# Patient Record
Sex: FEMALE | Race: WHITE | NOT HISPANIC OR LATINO | Employment: OTHER | URBAN - METROPOLITAN AREA
[De-identification: names, ages, dates, MRNs, and addresses within clinical notes are randomized per-mention and may not be internally consistent; named-entity substitution may affect disease eponyms.]

---

## 2017-03-06 ENCOUNTER — OFFICE VISIT (OUTPATIENT)
Dept: OBSTETRICS AND GYNECOLOGY | Facility: CLINIC | Age: 47
End: 2017-03-06
Payer: COMMERCIAL

## 2017-03-06 VITALS
SYSTOLIC BLOOD PRESSURE: 142 MMHG | WEIGHT: 153.25 LBS | BODY MASS INDEX: 29.92 KG/M2 | DIASTOLIC BLOOD PRESSURE: 90 MMHG

## 2017-03-06 DIAGNOSIS — Z01.419 VISIT FOR GYNECOLOGIC EXAMINATION: Primary | ICD-10-CM

## 2017-03-06 DIAGNOSIS — Z12.39 BREAST CANCER SCREENING: ICD-10-CM

## 2017-03-06 PROCEDURE — 99396 PREV VISIT EST AGE 40-64: CPT | Mod: S$GLB,,, | Performed by: OBSTETRICS & GYNECOLOGY

## 2017-03-06 PROCEDURE — 88142 CYTOPATH C/V THIN LAYER: CPT | Performed by: PATHOLOGY

## 2017-03-06 PROCEDURE — 99999 PR PBB SHADOW E&M-EST. PATIENT-LVL II: CPT | Mod: PBBFAC,,, | Performed by: OBSTETRICS & GYNECOLOGY

## 2017-03-06 PROCEDURE — 88141 CYTOPATH C/V INTERPRET: CPT | Mod: ,,, | Performed by: PATHOLOGY

## 2017-03-06 RX ORDER — FUROSEMIDE 40 MG/1
TABLET ORAL
Refills: 1 | COMMUNITY
Start: 2017-01-20 | End: 2022-12-15

## 2017-03-06 NOTE — PROGRESS NOTES
HISTORY OF PRESENT ILLNESS:    Carmita Cunningham is a 46 y.o. female, , Patient's last menstrual period was 2017 (exact date).,  presents for a routine exam and has no complaints. PAP DUE AND SUBMITTED. MAMMO DUE AND ORDERED.  MENSES NOT HEAVY, AND NO INTERMENSTRUAL, NO C/O    LAST VISIT 2015:  PAP DUE 2016. MENSES PAST SEVERAL MONTHS HAS HAD MORE CLOTS BUT NOT NECESSARILY HEAVIER. MENSES EVERY 6 WEEKS, TYPICAL FOR HER, NO INTERMENSTRUAL, LAST 5 DAYS. VASECTOMY FOR CONTRACEPTION. DISCUSSED OPTIONS FOR CONTROLLING HEAVY MENSES BUT PT PREFERS WATCHFUL WAITING. MAMMO DUE NEXT MONTH, ORDERED. DIFFICULTY WITH WEIGHT LOSS - WILL CHECK LABS AND DISCUSSED DIET  LAST VISIT :  DOING WELL, NEEDS REF FOR MAMMO. NO GYN C/O  PAP SUBMITTED AND DISCUSSED SCREENING RECOMMENDATIONS    History reviewed. No pertinent past medical history.    History reviewed. No pertinent surgical history.    MEDICATIONS AND ALLERGIES:      Current Outpatient Prescriptions:     furosemide (LASIX) 40 MG tablet, TK SS TO ONE T PO QD PRF EDEMA, Disp: , Rfl: 1    Review of patient's allergies indicates:   Allergen Reactions    No known drug allergies        Family History   Problem Relation Age of Onset    Breast cancer Maternal Grandmother 68    Colon cancer Neg Hx     Ovarian cancer Neg Hx     Stroke Neg Hx     Diabetes Neg Hx     Hypertension Neg Hx        Social History     Social History    Marital status:      Spouse name: N/A    Number of children: N/A    Years of education: N/A     Occupational History    Not on file.     Social History Main Topics    Smoking status: Never Smoker    Smokeless tobacco: Never Used    Alcohol use Yes      Comment: occasional    Drug use: No    Sexual activity: Yes     Partners: Male     Other Topics Concern    Not on file     Social History Narrative       COMPREHENSIVE GYN HISTORY:  PAP History: Denies abnormal Paps.  Infection History: Denies STDs. Denies PID.  Benign  History: Denies uterine fibroids. Denies ovarian cysts. Denies endometriosis. Denies other conditions.  Cancer History: Denies cervical cancer. Denies uterine cancer or hyperplasia. Denies ovarian cancer. Denies vulvar cancer or pre-cancer. Denies vaginal cancer or pre-cancer. Denies breast cancer. Denies colon cancer.  Sexual Activity History: Reports currently being sexually active  Menstrual History: Monthly, mild-moderate.  Contraception:vasectomy    ROS:  GENERAL: No weight changes. No swelling. No fatigue. No fever.  CARDIOVASCULAR: No chest pain. No shortness of breath. No leg cramps.   NEUROLOGICAL: No headaches. No vision changes.  BREASTS: No pain. No lumps. No discharge.  ABDOMEN: No pain. No nausea. No vomiting. No diarrhea. No constipation.  REPRODUCTIVE: No abnormal bleeding.   VULVA: No pain. No lesions. No itching.  VAGINA: No relaxation. No itching. No odor. No discharge. No lesions.  URINARY: No incontinence. No nocturia. No frequency. No dysuria.    BP (!) 142/90  Wt 69.5 kg (153 lb 3.5 oz)  LMP 02/08/2017 (Exact Date)  BMI 29.92 kg/m2    PE:  APPEARANCE: Well nourished, well developed, in no acute distress.  AFFECT: WNL, alert and oriented x 3.  SKIN: No acne or hirsutism.  NECK: Neck symmetric, without masses or thyromegaly.  NODES: No inguinal, cervical, axillary or femoral lymph node enlargement.  CHEST: Good respiratory effort.   ABDOMEN: Soft. No tenderness or masses. No hepatosplenomegaly. No hernias.  BREASTS: Symmetrical, no skin changes, visible lesions, palpable masses or nipple discharge bilaterally.  PELVIC: External female genitalia without lesions.  Female hair distribution. Adequate perineal body, Normal urethral meatus. Vagina moist and well rugated without lesions or discharge.  No significant cystocele or rectocele present. Cervix pink without lesions, discharge or tenderness. Uterus is normal size, regular, mobile and nontender. Adnexa without masses or  tenderness.  EXTREMITIES: No edema    PROCEDURES:  Pap    DIAGNOSIS:  1. Visit for gynecologic examination  Liquid-based pap smear, screening   2. Breast cancer screening  Mammo Digital Screening Bilat with Tomosynthesis CAD       LABS AND TESTS ORDERED:    MEDICATIONS PRESCRIBED:    COUNSELING:   The patient was counseled today on ACS PAP guidelines, with recommendations for yearly pelvic exams unless their uterus, cervix, and ovaries were removed for benign reasons; in that case, examinations every 3-5 years are recommended.  The patient was also counseled regarding monthly breast self-examination, routine STD screening for at-risk populations, prophylactic immunizations for transmitted infections such as  HPV, Pertussis, or Influenza as appropriate, and yearly mammograms when indicated by ACS guidelines.  She was advised to see her primary care physician for all other health maintenance.    FOLLOW-UP with me annually.

## 2017-03-13 ENCOUNTER — PATIENT MESSAGE (OUTPATIENT)
Dept: OBSTETRICS AND GYNECOLOGY | Facility: CLINIC | Age: 47
End: 2017-03-13

## 2017-03-13 ENCOUNTER — HOSPITAL ENCOUNTER (OUTPATIENT)
Dept: RADIOLOGY | Facility: HOSPITAL | Age: 47
Discharge: HOME | End: 2017-03-13
Attending: OBSTETRICS & GYNECOLOGY
Payer: COMMERCIAL

## 2017-03-13 DIAGNOSIS — Z12.31 VISIT FOR SCREENING MAMMOGRAM: ICD-10-CM

## 2017-03-13 DIAGNOSIS — Z12.39 BREAST CANCER SCREENING: ICD-10-CM

## 2017-03-13 PROCEDURE — 77063 BREAST TOMOSYNTHESIS BI: CPT | Mod: 26,,, | Performed by: RADIOLOGY

## 2017-03-13 PROCEDURE — 77067 SCR MAMMO BI INCL CAD: CPT | Mod: TC

## 2017-03-13 PROCEDURE — 77067 SCR MAMMO BI INCL CAD: CPT | Mod: 26,,, | Performed by: RADIOLOGY

## 2018-05-14 ENCOUNTER — TELEPHONE (OUTPATIENT)
Dept: OBSTETRICS AND GYNECOLOGY | Facility: CLINIC | Age: 48
End: 2018-05-14

## 2018-05-14 DIAGNOSIS — Z12.31 VISIT FOR SCREENING MAMMOGRAM: Primary | ICD-10-CM

## 2018-05-14 NOTE — TELEPHONE ENCOUNTER
Called patient back, orderws have been entered, and appt scheduled.. Patient is aware of date and time..

## 2018-05-14 NOTE — TELEPHONE ENCOUNTER
----- Message from Sherlyn Deshpande sent at 5/11/2018  2:37 PM CDT -----  Contact: SANIYA ERVIN [[id number]]            Name of Who is Calling: SANIYA ERVIN [[id number]]      What is the request in detail: requesting MMG orders       Can the clinic reply by [facility]:no      [facility]:[phone]

## 2018-05-21 ENCOUNTER — HOSPITAL ENCOUNTER (OUTPATIENT)
Dept: RADIOLOGY | Facility: HOSPITAL | Age: 48
Discharge: HOME | End: 2018-05-21
Attending: OBSTETRICS & GYNECOLOGY
Payer: COMMERCIAL

## 2018-05-21 DIAGNOSIS — Z12.31 VISIT FOR SCREENING MAMMOGRAM: ICD-10-CM

## 2018-05-21 PROCEDURE — 77067 SCR MAMMO BI INCL CAD: CPT | Mod: 26,,, | Performed by: RADIOLOGY

## 2018-05-21 PROCEDURE — 77067 SCR MAMMO BI INCL CAD: CPT | Mod: TC

## 2018-05-21 PROCEDURE — 77063 BREAST TOMOSYNTHESIS BI: CPT | Mod: 26,,, | Performed by: RADIOLOGY

## 2019-05-15 ENCOUNTER — TELEPHONE (OUTPATIENT)
Dept: OBSTETRICS AND GYNECOLOGY | Facility: CLINIC | Age: 49
End: 2019-05-15

## 2019-05-15 DIAGNOSIS — Z12.31 VISIT FOR SCREENING MAMMOGRAM: Primary | ICD-10-CM

## 2019-05-15 NOTE — TELEPHONE ENCOUNTER
Called patient back, no answer left message - mammo orders have been entered, patient can schedule at any  time..

## 2019-05-15 NOTE — TELEPHONE ENCOUNTER
----- Message from Jeremy Bonner sent at 5/15/2019  4:46 PM CDT -----  Contact: Pt  Needs Advice    Reason for call:The Pt received her reminder letter to schedule her Mammo.  Could you send over an order/referral for the Pt and call her after to let her know she can call us back to schedule that appt please?        Communication Preference:[phone]    Additional Information:

## 2019-06-24 ENCOUNTER — HOSPITAL ENCOUNTER (OUTPATIENT)
Dept: RADIOLOGY | Facility: HOSPITAL | Age: 49
Discharge: HOME | End: 2019-06-24
Attending: OBSTETRICS & GYNECOLOGY
Payer: COMMERCIAL

## 2019-06-24 VITALS — BODY MASS INDEX: 30.04 KG/M2 | WEIGHT: 153 LBS | HEIGHT: 60 IN

## 2019-06-24 DIAGNOSIS — Z12.31 VISIT FOR SCREENING MAMMOGRAM: ICD-10-CM

## 2019-06-24 PROCEDURE — 77067 SCR MAMMO BI INCL CAD: CPT | Mod: 26,,, | Performed by: RADIOLOGY

## 2019-06-24 PROCEDURE — 77063 MAMMO DIGITAL SCREENING BILAT WITH TOMOSYNTHESIS_CAD: ICD-10-PCS | Mod: 26,,, | Performed by: RADIOLOGY

## 2019-06-24 PROCEDURE — 77067 MAMMO DIGITAL SCREENING BILAT WITH TOMOSYNTHESIS_CAD: ICD-10-PCS | Mod: 26,,, | Performed by: RADIOLOGY

## 2019-06-24 PROCEDURE — 77063 BREAST TOMOSYNTHESIS BI: CPT | Mod: 26,,, | Performed by: RADIOLOGY

## 2019-06-24 PROCEDURE — 77067 SCR MAMMO BI INCL CAD: CPT | Mod: TC

## 2020-07-22 ENCOUNTER — OFFICE VISIT (OUTPATIENT)
Dept: OBSTETRICS AND GYNECOLOGY | Facility: CLINIC | Age: 50
End: 2020-07-22
Payer: COMMERCIAL

## 2020-07-22 VITALS
SYSTOLIC BLOOD PRESSURE: 140 MMHG | WEIGHT: 167.56 LBS | HEIGHT: 60 IN | BODY MASS INDEX: 32.89 KG/M2 | DIASTOLIC BLOOD PRESSURE: 90 MMHG

## 2020-07-22 DIAGNOSIS — K64.9 HEMORRHOIDS, UNSPECIFIED HEMORRHOID TYPE: ICD-10-CM

## 2020-07-22 DIAGNOSIS — K62.5 RECTAL BLEED: ICD-10-CM

## 2020-07-22 DIAGNOSIS — Z01.419 VISIT FOR GYNECOLOGIC EXAMINATION: Primary | ICD-10-CM

## 2020-07-22 DIAGNOSIS — Z12.39 BREAST CANCER SCREENING: ICD-10-CM

## 2020-07-22 PROCEDURE — 3008F BODY MASS INDEX DOCD: CPT | Mod: CPTII,S$GLB,, | Performed by: OBSTETRICS & GYNECOLOGY

## 2020-07-22 PROCEDURE — 88142 CYTOPATH C/V THIN LAYER: CPT

## 2020-07-22 PROCEDURE — 3008F PR BODY MASS INDEX (BMI) DOCUMENTED: ICD-10-PCS | Mod: CPTII,S$GLB,, | Performed by: OBSTETRICS & GYNECOLOGY

## 2020-07-22 PROCEDURE — 99999 PR PBB SHADOW E&M-EST. PATIENT-LVL III: ICD-10-PCS | Mod: PBBFAC,,, | Performed by: OBSTETRICS & GYNECOLOGY

## 2020-07-22 PROCEDURE — 87624 HPV HI-RISK TYP POOLED RSLT: CPT

## 2020-07-22 PROCEDURE — 99999 PR PBB SHADOW E&M-EST. PATIENT-LVL III: CPT | Mod: PBBFAC,,, | Performed by: OBSTETRICS & GYNECOLOGY

## 2020-07-22 PROCEDURE — 99386 PREV VISIT NEW AGE 40-64: CPT | Mod: S$GLB,,, | Performed by: OBSTETRICS & GYNECOLOGY

## 2020-07-22 PROCEDURE — 99386 PR PREVENTIVE VISIT,NEW,40-64: ICD-10-PCS | Mod: S$GLB,,, | Performed by: OBSTETRICS & GYNECOLOGY

## 2020-07-22 NOTE — PROGRESS NOTES
HISTORY OF PRESENT ILLNESS:    Carmita Cunningham is a 49 y.o. female, , Patient's last menstrual period was 2020.,  presents for a routine exam and has no complaints. COTESTING,REF MAMMO.  HAS SOME BLEEDING HEMORRHOIDS - COMFORT MEASURES, HAS RESOLVED WITH BETTER DIET/ MORE VEGGIES AND NO PAIN.  FIT KIT AND IF ABNL REF AND COLORECTAL  ON BOAT AND AT [facility] THRU MUCH OF COVID ..      LAST 2017:   PAP DUE AND SUBMITTED. MAMMO DUE AND ORDERED.  MENSES NOT HEAVY, AND NO INTERMENSTRUAL, NO C/O  LAST VISIT 2015:  PAP DUE 2016. MENSES PAST SEVERAL MONTHS HAS HAD MORE CLOTS BUT NOT NECESSARILY HEAVIER. MENSES EVERY 6 WEEKS, TYPICAL FOR HER, NO INTERMENSTRUAL, LAST 5 DAYS. VASECTOMY FOR CONTRACEPTION. DISCUSSED OPTIONS FOR CONTROLLING HEAVY MENSES BUT PT PREFERS WATCHFUL WAITING. MAMMO DUE NEXT MONTH, ORDERED. DIFFICULTY WITH WEIGHT LOSS - WILL CHECK LABS AND DISCUSSED DIET  LAST VISIT :  DOING WELL, NEEDS REF FOR MAMMO. NO GYN C/O  PAP SUBMITTED AND DISCUSSED SCREENING RECOMMENDATIONS    History reviewed. No pertinent past medical history.    History reviewed. No pertinent surgical history.    MEDICATIONS AND ALLERGIES:      Current Outpatient Medications:     furosemide (LASIX) 40 MG tablet, TK SS TO ONE T PO QD PRF EDEMA, Disp: , Rfl: 1    Review of patient's allergies indicates:   Allergen Reactions    No known drug allergies        Family History   Problem Relation Age of Onset    Breast cancer Maternal Grandmother 68    Colon cancer Neg Hx     Ovarian cancer Neg Hx     Stroke Neg Hx     Diabetes Neg Hx     Hypertension Neg Hx        Social History     Socioeconomic History    Marital status:      Spouse name: Not on file    Number of children: Not on file    Years of education: Not on file    Highest education level: Not on file   Occupational History    Not on file   Social Needs    Financial resource strain: Not on file    Food insecurity     Worry: Not on file      Inability: Not on file    Transportation needs     Medical: Not on file     Non-medical: Not on file   Tobacco Use    Smoking status: Never Smoker    Smokeless tobacco: Never Used   Substance and Sexual Activity    Alcohol use: Yes     Comment: occasional    Drug use: No    Sexual activity: Yes     Partners: Male   Lifestyle    Physical activity     Days per week: Not on file     Minutes per session: Not on file    Stress: Not on file   Relationships    Social connections     Talks on phone: Not on file     Gets together: Not on file     Attends [employer] service: Not on file     Active member of club or organization: Not on file     Attends meetings of clubs or organizations: Not on file     Relationship status: Not on file   Other Topics Concern    Not on file   Social History Narrative    Not on file       COMPREHENSIVE GYN HISTORY:  PAP History: Denies abnormal Paps.  Infection History: Denies STDs. Denies PID.  Benign History: Denies uterine fibroids. Denies ovarian cysts. Denies endometriosis. Denies other conditions.  Cancer History: Denies cervical cancer. Denies uterine cancer or hyperplasia. Denies ovarian cancer. Denies vulvar cancer or pre-cancer. Denies vaginal cancer or pre-cancer. Denies breast cancer. Denies colon cancer.  Sexual Activity History: Reports currently being sexually active  Menstrual History: Monthly, mild-moderate.  Contraception:vasectomy    ROS:  GENERAL: No weight changes. No swelling. No fatigue. No fever.  CARDIOVASCULAR: No chest pain. No shortness of breath. No leg cramps.   NEUROLOGICAL: No headaches. No vision changes.  BREASTS: No pain. No lumps. No discharge.  ABDOMEN: No pain. No nausea. No vomiting. No diarrhea. No constipation.  REPRODUCTIVE: No abnormal bleeding.   VULVA: No pain. No lesions. No itching.  VAGINA: No relaxation. No itching. No odor. No discharge. No lesions.  URINARY: No incontinence. No nocturia. No frequency. No dysuria.    BP (!) 140/90    Ht 5' (1.524 m)   Wt 76 kg (167 lb 8.8 oz)   LMP 07/13/2020   BMI 32.72 kg/m²     PE:  APPEARANCE: Well nourished, well developed, in no acute distress.  AFFECT: WNL, alert and oriented x 3.  SKIN: No acne or hirsutism.  NECK: Neck symmetric, without masses or thyromegaly.  NODES: No inguinal, cervical, axillary or femoral lymph node enlargement.  CHEST: Good respiratory effort.   ABDOMEN: Soft. No tenderness or masses. No hepatosplenomegaly. No hernias.  BREASTS: Symmetrical, no skin changes, visible lesions, palpable masses or nipple discharge bilaterally.  PELVIC: External female genitalia without lesions.  Female hair distribution. Adequate perineal body, Normal urethral meatus. Vagina moist and well rugated without lesions or discharge.  No significant cystocele or rectocele present. Cervix pink without lesions, discharge or tenderness. Uterus is normal size, regular, mobile and nontender. Adnexa without masses or tenderness.  EXTREMITIES: No edema    PROCEDURES:  Pap    DIAGNOSIS:  1. Visit for gynecologic examination  Liquid-Based Pap Smear, Screening    HPV High Risk Genotypes, PCR   2. Breast cancer screening  Mammo Digital Screening Bilat w/ Alexander   3. Rectal bleed  Occult Blood Stool, CA Screen   4. Hemorrhoids, unspecified hemorrhoid type  Occult Blood Stool, CA Screen       LABS AND TESTS ORDERED:    MEDICATIONS PRESCRIBED:    COUNSELING:   The patient was counseled today on ACS PAP guidelines, with recommendations for yearly pelvic exams unless their uterus, cervix, and ovaries were removed for benign reasons; in that case, examinations every 3-5 years are recommended.  The patient was also counseled regarding monthly breast self-examination, routine STD screening for at-risk populations, prophylactic immunizations for transmitted infections such as  HPV, Pertussis, or Influenza as appropriate, and yearly mammograms when indicated by ACS guidelines.  She was advised to see her primary care  physician for all other health maintenance.    FOLLOW-UP with me annually.

## 2020-07-27 ENCOUNTER — LAB VISIT (OUTPATIENT)
Dept: LAB | Facility: HOSPITAL | Age: 50
End: 2020-07-27
Attending: OBSTETRICS & GYNECOLOGY
Payer: COMMERCIAL

## 2020-07-27 DIAGNOSIS — R19.5 OCCULT BLOOD IN STOOLS: ICD-10-CM

## 2020-07-27 DIAGNOSIS — Z12.11 ENCOUNTER FOR SCREENING FECAL OCCULT BLOOD TESTING: ICD-10-CM

## 2020-07-27 PROCEDURE — 82274 ASSAY TEST FOR BLOOD FECAL: CPT

## 2020-07-30 ENCOUNTER — PATIENT OUTREACH (OUTPATIENT)
Dept: ADMINISTRATIVE | Facility: HOSPITAL | Age: 50
End: 2020-07-30

## 2020-07-30 ENCOUNTER — TELEPHONE (OUTPATIENT)
Dept: OBSTETRICS AND GYNECOLOGY | Facility: CLINIC | Age: 50
End: 2020-07-30

## 2020-07-30 DIAGNOSIS — Z12.11 ENCOUNTER FOR SCREENING FECAL OCCULT BLOOD TESTING: ICD-10-CM

## 2020-07-30 DIAGNOSIS — R19.5 OCCULT BLOOD IN STOOLS: Primary | ICD-10-CM

## 2020-07-30 LAB
HPV HR 12 DNA SPEC QL NAA+PROBE: NEGATIVE
HPV16 AG SPEC QL: NEGATIVE
HPV18 DNA SPEC QL NAA+PROBE: NEGATIVE

## 2020-07-30 NOTE — PROGRESS NOTES
Patient came up on the FOBT workbook needing orders: Occult Blood Stool, CA screen enter on 07/22/2020 by Sandy Martínez MD. Diagnoses Hemorrhoids Patient is 49 years old. Message sent to staff to enter fit kit order

## 2020-08-03 ENCOUNTER — HOSPITAL ENCOUNTER (OUTPATIENT)
Dept: RADIOLOGY | Facility: HOSPITAL | Age: 50
Discharge: HOME | End: 2020-08-03
Attending: OBSTETRICS & GYNECOLOGY
Payer: COMMERCIAL

## 2020-08-03 DIAGNOSIS — Z12.39 BREAST CANCER SCREENING: ICD-10-CM

## 2020-08-03 LAB — HEMOCCULT STL QL IA: NEGATIVE

## 2020-08-03 PROCEDURE — 77063 BREAST TOMOSYNTHESIS BI: CPT | Mod: 26,,, | Performed by: RADIOLOGY

## 2020-08-03 PROCEDURE — 77063 MAMMO DIGITAL SCREENING BILAT WITH TOMOSYNTHESIS_CAD: ICD-10-PCS | Mod: 26,,, | Performed by: RADIOLOGY

## 2020-08-03 PROCEDURE — 77067 SCR MAMMO BI INCL CAD: CPT | Mod: TC

## 2020-08-03 PROCEDURE — 77067 MAMMO DIGITAL SCREENING BILAT WITH TOMOSYNTHESIS_CAD: ICD-10-PCS | Mod: 26,,, | Performed by: RADIOLOGY

## 2020-08-03 PROCEDURE — 77067 SCR MAMMO BI INCL CAD: CPT | Mod: 26,,, | Performed by: RADIOLOGY

## 2020-08-04 LAB
FINAL PATHOLOGIC DIAGNOSIS: NORMAL
Lab: NORMAL

## 2021-04-16 ENCOUNTER — PATIENT MESSAGE (OUTPATIENT)
Dept: RESEARCH | Facility: HOSPITAL | Age: 51
End: 2021-04-16

## 2021-07-26 ENCOUNTER — OFFICE VISIT (OUTPATIENT)
Dept: OBSTETRICS AND GYNECOLOGY | Facility: CLINIC | Age: 51
End: 2021-07-26
Payer: COMMERCIAL

## 2021-07-26 VITALS
BODY MASS INDEX: 32.46 KG/M2 | WEIGHT: 166.25 LBS | SYSTOLIC BLOOD PRESSURE: 122 MMHG | DIASTOLIC BLOOD PRESSURE: 74 MMHG

## 2021-07-26 DIAGNOSIS — Z01.419 VISIT FOR GYNECOLOGIC EXAMINATION: Primary | ICD-10-CM

## 2021-07-26 DIAGNOSIS — Z12.31 ENCOUNTER FOR SCREENING MAMMOGRAM FOR MALIGNANT NEOPLASM OF BREAST: ICD-10-CM

## 2021-07-26 PROCEDURE — 3008F BODY MASS INDEX DOCD: CPT | Mod: CPTII,S$GLB,, | Performed by: OBSTETRICS & GYNECOLOGY

## 2021-07-26 PROCEDURE — 1160F RVW MEDS BY RX/DR IN RCRD: CPT | Mod: CPTII,S$GLB,, | Performed by: OBSTETRICS & GYNECOLOGY

## 2021-07-26 PROCEDURE — 99396 PREV VISIT EST AGE 40-64: CPT | Mod: S$GLB,,, | Performed by: OBSTETRICS & GYNECOLOGY

## 2021-07-26 PROCEDURE — 99999 PR PBB SHADOW E&M-EST. PATIENT-LVL II: ICD-10-PCS | Mod: PBBFAC,,, | Performed by: OBSTETRICS & GYNECOLOGY

## 2021-07-26 PROCEDURE — 1160F PR REVIEW ALL MEDS BY PRESCRIBER/CLIN PHARMACIST DOCUMENTED: ICD-10-PCS | Mod: CPTII,S$GLB,, | Performed by: OBSTETRICS & GYNECOLOGY

## 2021-07-26 PROCEDURE — 1159F MED LIST DOCD IN RCRD: CPT | Mod: CPTII,S$GLB,, | Performed by: OBSTETRICS & GYNECOLOGY

## 2021-07-26 PROCEDURE — 99396 PR PREVENTIVE VISIT,EST,40-64: ICD-10-PCS | Mod: S$GLB,,, | Performed by: OBSTETRICS & GYNECOLOGY

## 2021-07-26 PROCEDURE — 1159F PR MEDICATION LIST DOCUMENTED IN MEDICAL RECORD: ICD-10-PCS | Mod: CPTII,S$GLB,, | Performed by: OBSTETRICS & GYNECOLOGY

## 2021-07-26 PROCEDURE — 3008F PR BODY MASS INDEX (BMI) DOCUMENTED: ICD-10-PCS | Mod: CPTII,S$GLB,, | Performed by: OBSTETRICS & GYNECOLOGY

## 2021-07-26 PROCEDURE — 99999 PR PBB SHADOW E&M-EST. PATIENT-LVL II: CPT | Mod: PBBFAC,,, | Performed by: OBSTETRICS & GYNECOLOGY

## 2021-08-09 ENCOUNTER — HOSPITAL ENCOUNTER (OUTPATIENT)
Dept: RADIOLOGY | Facility: HOSPITAL | Age: 51
Discharge: HOME | End: 2021-08-09
Attending: OBSTETRICS & GYNECOLOGY
Payer: COMMERCIAL

## 2021-08-09 VITALS — WEIGHT: 166 LBS | BODY MASS INDEX: 32.59 KG/M2 | HEIGHT: 60 IN

## 2021-08-09 DIAGNOSIS — Z12.31 ENCOUNTER FOR SCREENING MAMMOGRAM FOR MALIGNANT NEOPLASM OF BREAST: ICD-10-CM

## 2021-08-09 PROCEDURE — 77063 BREAST TOMOSYNTHESIS BI: CPT | Mod: 26,,, | Performed by: RADIOLOGY

## 2021-08-09 PROCEDURE — 77067 MAMMO DIGITAL SCREENING BILAT WITH TOMO: ICD-10-PCS | Mod: 26,,, | Performed by: RADIOLOGY

## 2021-08-09 PROCEDURE — 77067 SCR MAMMO BI INCL CAD: CPT | Mod: 26,,, | Performed by: RADIOLOGY

## 2021-08-09 PROCEDURE — 77063 MAMMO DIGITAL SCREENING BILAT WITH TOMO: ICD-10-PCS | Mod: 26,,, | Performed by: RADIOLOGY

## 2021-08-09 PROCEDURE — 77067 SCR MAMMO BI INCL CAD: CPT | Mod: TC

## 2021-08-18 ENCOUNTER — HOSPITAL ENCOUNTER (OUTPATIENT)
Dept: RADIOLOGY | Facility: HOSPITAL | Age: 51
Discharge: HOME | End: 2021-08-18
Attending: OBSTETRICS & GYNECOLOGY
Payer: COMMERCIAL

## 2021-08-18 DIAGNOSIS — R92.8 ABNORMAL MAMMOGRAM OF RIGHT BREAST: ICD-10-CM

## 2021-08-18 PROCEDURE — 77065 MAMMO DIGITAL DIAGNOSTIC RIGHT WITH TOMO: ICD-10-PCS | Mod: 26,RT,, | Performed by: RADIOLOGY

## 2021-08-18 PROCEDURE — 77061 BREAST TOMOSYNTHESIS UNI: CPT | Mod: TC,RT

## 2021-08-18 PROCEDURE — 77061 BREAST TOMOSYNTHESIS UNI: CPT | Mod: 26,RT,, | Performed by: RADIOLOGY

## 2021-08-18 PROCEDURE — 77065 DX MAMMO INCL CAD UNI: CPT | Mod: 26,RT,, | Performed by: RADIOLOGY

## 2021-08-18 PROCEDURE — 77061 MAMMO DIGITAL DIAGNOSTIC RIGHT WITH TOMO: ICD-10-PCS | Mod: 26,RT,, | Performed by: RADIOLOGY

## 2021-11-09 ENCOUNTER — TELEPHONE (OUTPATIENT)
Dept: BARIATRICS | Facility: CLINIC | Age: 51
End: 2021-11-09
Payer: COMMERCIAL

## 2022-08-15 ENCOUNTER — TELEPHONE (OUTPATIENT)
Dept: OBSTETRICS AND GYNECOLOGY | Facility: CLINIC | Age: 52
End: 2022-08-15
Payer: COMMERCIAL

## 2022-08-15 DIAGNOSIS — Z12.31 ENCOUNTER FOR SCREENING MAMMOGRAM FOR BREAST CANCER: Primary | ICD-10-CM

## 2022-08-15 NOTE — TELEPHONE ENCOUNTER
----- Message from Landy Rice LPN sent at 8/12/2022  3:31 PM CDT -----  Regarding: FW: Mammo orders    ----- Message -----  From: Nguyen Garcia  Sent: 8/12/2022   9:20 AM CDT  To: Tanya ALBARRAN Staff  Subject: Mammo orders                                      Patient call to see about getting her mammo orders , if Dr Martínez could please put them in Epic    Thank you

## 2022-08-18 ENCOUNTER — TELEPHONE (OUTPATIENT)
Dept: OBSTETRICS AND GYNECOLOGY | Facility: CLINIC | Age: 52
End: 2022-08-18
Payer: COMMERCIAL

## 2022-08-18 NOTE — TELEPHONE ENCOUNTER
----- Message from Nguyen Garcia sent at 8/12/2022  9:18 AM CDT -----  Regarding: Mammo orders   Patient call to see about getting her mammo orders , if Dr Martínez could please put them in Epic    Thank you

## 2022-08-18 NOTE — TELEPHONE ENCOUNTER
Pt call was returned. Pt stated that her mammo orders were placed. Also advised her that her upcoming appointment would have to be r/s. Pt stated that she will r/s on her own.

## 2022-08-22 ENCOUNTER — HOSPITAL ENCOUNTER (OUTPATIENT)
Dept: RADIOLOGY | Facility: HOSPITAL | Age: 52
Discharge: HOME | End: 2022-08-22
Attending: PHYSICIAN ASSISTANT
Payer: COMMERCIAL

## 2022-08-22 DIAGNOSIS — Z12.31 ENCOUNTER FOR SCREENING MAMMOGRAM FOR BREAST CANCER: ICD-10-CM

## 2022-08-22 PROCEDURE — 77063 BREAST TOMOSYNTHESIS BI: CPT | Mod: 26,,, | Performed by: RADIOLOGY

## 2022-08-22 PROCEDURE — 77067 SCR MAMMO BI INCL CAD: CPT | Mod: 26,,, | Performed by: RADIOLOGY

## 2022-08-22 PROCEDURE — 77067 MAMMO DIGITAL SCREENING BILAT WITH TOMO: ICD-10-PCS | Mod: 26,,, | Performed by: RADIOLOGY

## 2022-08-22 PROCEDURE — 77063 BREAST TOMOSYNTHESIS BI: CPT | Mod: TC

## 2022-08-22 PROCEDURE — 77063 MAMMO DIGITAL SCREENING BILAT WITH TOMO: ICD-10-PCS | Mod: 26,,, | Performed by: RADIOLOGY

## 2022-12-02 ENCOUNTER — TELEPHONE (OUTPATIENT)
Dept: OPHTHALMOLOGY | Facility: CLINIC | Age: 52
End: 2022-12-02
Payer: COMMERCIAL

## 2022-12-02 DIAGNOSIS — A15.9 TUBERCULOSIS: ICD-10-CM

## 2022-12-02 DIAGNOSIS — D86.9 SARCOIDOSIS: ICD-10-CM

## 2022-12-02 DIAGNOSIS — H46.9 OPTIC NEURITIS: Primary | ICD-10-CM

## 2022-12-02 NOTE — TELEPHONE ENCOUNTER
----- Message from Ary Parekh sent at 12/2/2022 11:46 AM CST -----  Patient is calling again stating that orders were supposed to be sent over. She is getting doctor's office to resend.    Please contact [phone]

## 2022-12-05 ENCOUNTER — HOSPITAL ENCOUNTER (OUTPATIENT)
Dept: RADIOLOGY | Facility: HOSPITAL | Age: 52
Discharge: HOME | End: 2022-12-05
Payer: COMMERCIAL

## 2022-12-05 DIAGNOSIS — H53.139: Primary | ICD-10-CM

## 2022-12-05 DIAGNOSIS — H53.412 CENTRAL SCOTOMA, LEFT EYE: Primary | ICD-10-CM

## 2022-12-05 DIAGNOSIS — H46.9 OPTIC NEURITIS: ICD-10-CM

## 2022-12-05 DIAGNOSIS — D86.9 SARCOIDOSIS: ICD-10-CM

## 2022-12-05 DIAGNOSIS — A15.9 TUBERCULOSIS: ICD-10-CM

## 2022-12-05 PROCEDURE — 71046 XR CHEST PA AND LATERAL: ICD-10-PCS | Mod: 26,,, | Performed by: RADIOLOGY

## 2022-12-05 PROCEDURE — 71046 X-RAY EXAM CHEST 2 VIEWS: CPT | Mod: 26,,, | Performed by: RADIOLOGY

## 2022-12-05 PROCEDURE — 71046 X-RAY EXAM CHEST 2 VIEWS: CPT | Mod: TC,FY

## 2022-12-12 ENCOUNTER — HOSPITAL ENCOUNTER (OUTPATIENT)
Dept: RADIOLOGY | Facility: HOSPITAL | Age: 52
Discharge: HOME | End: 2022-12-12
Payer: COMMERCIAL

## 2022-12-12 DIAGNOSIS — H53.139: ICD-10-CM

## 2022-12-12 PROCEDURE — 70480 CT ORBIT/EAR/FOSSA W/O DYE: CPT | Mod: TC

## 2022-12-12 PROCEDURE — 70480 CT ORBIT/EAR/FOSSA W/O DYE: CPT | Mod: 26,,, | Performed by: RADIOLOGY

## 2022-12-12 PROCEDURE — 70480 CT ORBITS WITHOUT CONTRAST: ICD-10-PCS | Mod: 26,,, | Performed by: RADIOLOGY

## 2022-12-15 ENCOUNTER — OFFICE VISIT (OUTPATIENT)
Dept: OPHTHALMOLOGY | Facility: CLINIC | Age: 52
End: 2022-12-15
Payer: COMMERCIAL

## 2022-12-15 ENCOUNTER — CLINICAL SUPPORT (OUTPATIENT)
Dept: OPHTHALMOLOGY | Facility: CLINIC | Age: 52
End: 2022-12-15
Payer: COMMERCIAL

## 2022-12-15 DIAGNOSIS — H53.412 CENTRAL SCOTOMA, LEFT EYE: ICD-10-CM

## 2022-12-15 DIAGNOSIS — H47.012 NAION (NON-ARTERITIC ANTERIOR ISCHEMIC OPTIC NEUROPATHY), LEFT EYE: Primary | ICD-10-CM

## 2022-12-15 PROCEDURE — 99203 OFFICE O/P NEW LOW 30 MIN: CPT | Mod: S$GLB,,, | Performed by: OPHTHALMOLOGY

## 2022-12-15 PROCEDURE — 99203 PR OFFICE/OUTPT VISIT, NEW, LEVL III, 30-44 MIN: ICD-10-PCS | Mod: S$GLB,,, | Performed by: OPHTHALMOLOGY

## 2022-12-15 PROCEDURE — 4010F PR ACE/ARB THEARPY RXD/TAKEN: ICD-10-PCS | Mod: CPTII,S$GLB,, | Performed by: OPHTHALMOLOGY

## 2022-12-15 PROCEDURE — 99999 PR PBB SHADOW E&M-EST. PATIENT-LVL II: CPT | Mod: PBBFAC,,, | Performed by: OPHTHALMOLOGY

## 2022-12-15 PROCEDURE — 4010F ACE/ARB THERAPY RXD/TAKEN: CPT | Mod: CPTII,S$GLB,, | Performed by: OPHTHALMOLOGY

## 2022-12-15 PROCEDURE — 99999 PR PBB SHADOW E&M-EST. PATIENT-LVL II: ICD-10-PCS | Mod: PBBFAC,,, | Performed by: OPHTHALMOLOGY

## 2022-12-15 PROCEDURE — 1160F RVW MEDS BY RX/DR IN RCRD: CPT | Mod: CPTII,S$GLB,, | Performed by: OPHTHALMOLOGY

## 2022-12-15 PROCEDURE — 1160F PR REVIEW ALL MEDS BY PRESCRIBER/CLIN PHARMACIST DOCUMENTED: ICD-10-PCS | Mod: CPTII,S$GLB,, | Performed by: OPHTHALMOLOGY

## 2022-12-15 PROCEDURE — 1159F PR MEDICATION LIST DOCUMENTED IN MEDICAL RECORD: ICD-10-PCS | Mod: CPTII,S$GLB,, | Performed by: OPHTHALMOLOGY

## 2022-12-15 PROCEDURE — 1159F MED LIST DOCD IN RCRD: CPT | Mod: CPTII,S$GLB,, | Performed by: OPHTHALMOLOGY

## 2022-12-15 RX ORDER — TIRZEPATIDE 5 MG/.5ML
5 INJECTION, SOLUTION SUBCUTANEOUS WEEKLY
COMMUNITY
Start: 2022-11-14

## 2022-12-15 NOTE — PROGRESS NOTES
Visual field test done.  Patient stated no latex allergies used coverlet       Mrx  2.75+ 0.75 x 92  2.75+ 0.75 x 78

## 2022-12-15 NOTE — PROGRESS NOTES
HPI    Referred by Dr.Joseph Deniz MITCHELL  Patient here for evaluation of Optic Neuritis OS.  Patient states OS cloudy since 11/28/2022. Pt states feels as if she   seeing light more.    I have personally interviewed the patient, reviewed the history and   examined the patient and agree with the technician's exam.    Noted upon awakening in the morning.  CT scan reported as normal. No   evidence of GCA.        Last edited by Iam Barnhart MD on 12/15/2022  3:53 PM.            Assessment /Plan     For exam results, see Encounter Report.    NAION (non-arteritic anterior ischemic optic neuropathy), left eye      Ms. 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 Sje kasandra no evidence to suggest GCA and a CT showed no mass lesion. She has a good chance of regaining some vision in her  left eye. I will repeat her exam and visual field testing in 3 months. IONDT discussed in detail.

## 2022-12-15 NOTE — LETTER
[facility] - [address] [place] [address]  [address]  [facility] [address]  Phone: [phone]  Fax: [phone]   December 15, 2022    Aleks Guaman MD  [address]  [place] [address]    Patient: Carmita Cunningham   MR Number: [id number]   YOB: 1970   Date of Visit: 12/15/2022       Dear Dr. Guaman:    Thank you for referring Carmita Cunningham to me for evaluation. Here is my assessment and plan of care:    Assessment/Plan    For exam results, see Encounter Report.    NAION (non-arteritic anterior ischemic optic neuropathy), left eye      Ms. 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 Sje kasandra no evidence to suggest GCA and a CT showed no mass lesion. She has a good chance of regaining some vision in her  left eye. I will repeat her exam and visual field testing in 3 months. IONDT discussed in detail.          Below you will find my full exam findings. If you have questions, please do not hesitate to call me. I look forward to following Ms. Carmita Cunningham along with you.    Sincerely,          Iam Barnhart MD       CC  No Recipients             Base Eye Exam       Visual Acuity (Snellen - Linear)         Right Left    Dist cc 20/20 CF at face      Correction: Glasses              Tonometry (Applanation, 3:58 PM)         Right Left    Pressure 17 18              Pupils         Dark Light Shape React APD    Right 5 3 Round Brisk None    Left 5 3 Round Brisk +3              Visual Fields    See HVF report             Extraocular Movement         Right Left     Full, Ortho Full, Ortho              Neuro/Psych       Oriented x3: Yes    Mood/Affect: Normal              Dilation       Both eyes: 1% Mydriacyl, 2.5% Phenylephrine @ 4:00 PM                  Slit Lamp and Fundus Exam       External Exam         Right Left    External Normal Normal              Slit Lamp Exam         Right Left     Lids/Lashes Normal Normal    Conjunctiva/Sclera White and quiet White and quiet    Cornea Clear Clear    Anterior Chamber Deep and quiet Deep and quiet    Iris Round and reactive Round and reactive    Lens Clear Clear    Vitreous Normal Normal              Fundus Exam         Right Left    Disc Crowded, no cup 3+ Optic disc edema, 2+ Pallor    C/D Ratio 0.0 0.0    Macula Normal Normal    Vessels Normal Normal    Periphery Normal Normal

## 2023-03-13 ENCOUNTER — OFFICE VISIT (OUTPATIENT)
Dept: OPHTHALMOLOGY | Facility: CLINIC | Age: 53
End: 2023-03-13
Payer: COMMERCIAL

## 2023-03-13 ENCOUNTER — CLINICAL SUPPORT (OUTPATIENT)
Dept: OPHTHALMOLOGY | Facility: CLINIC | Age: 53
End: 2023-03-13
Payer: COMMERCIAL

## 2023-03-13 DIAGNOSIS — H47.012 NAION (NON-ARTERITIC ANTERIOR ISCHEMIC OPTIC NEUROPATHY), LEFT EYE: Primary | ICD-10-CM

## 2023-03-13 DIAGNOSIS — H47.012 NAION (NON-ARTERITIC ANTERIOR ISCHEMIC OPTIC NEUROPATHY), LEFT EYE: ICD-10-CM

## 2023-03-13 PROCEDURE — 92133 POSTERIOR SEGMENT OCT OPTIC NERVE(OCULAR COHERENCE TOMOGRAPHY) - OU - BOTH EYES: ICD-10-PCS | Mod: S$GLB,,, | Performed by: OPHTHALMOLOGY

## 2023-03-13 PROCEDURE — 92083 EXTENDED VISUAL FIELD XM: CPT | Mod: S$GLB,,, | Performed by: OPHTHALMOLOGY

## 2023-03-13 PROCEDURE — 99214 OFFICE O/P EST MOD 30 MIN: CPT | Mod: S$GLB,,, | Performed by: OPHTHALMOLOGY

## 2023-03-13 PROCEDURE — 1160F RVW MEDS BY RX/DR IN RCRD: CPT | Mod: CPTII,S$GLB,, | Performed by: OPHTHALMOLOGY

## 2023-03-13 PROCEDURE — 99999 PR PBB SHADOW E&M-EST. PATIENT-LVL II: ICD-10-PCS | Mod: PBBFAC,,, | Performed by: OPHTHALMOLOGY

## 2023-03-13 PROCEDURE — 92083 HUMPHREY VISUAL FIELD - OU - BOTH EYES: ICD-10-PCS | Mod: S$GLB,,, | Performed by: OPHTHALMOLOGY

## 2023-03-13 PROCEDURE — 99999 PR PBB SHADOW E&M-EST. PATIENT-LVL II: CPT | Mod: PBBFAC,,, | Performed by: OPHTHALMOLOGY

## 2023-03-13 PROCEDURE — 92133 CPTRZD OPH DX IMG PST SGM ON: CPT | Mod: S$GLB,,, | Performed by: OPHTHALMOLOGY

## 2023-03-13 PROCEDURE — 1159F MED LIST DOCD IN RCRD: CPT | Mod: CPTII,S$GLB,, | Performed by: OPHTHALMOLOGY

## 2023-03-13 PROCEDURE — 99214 PR OFFICE/OUTPT VISIT, EST, LEVL IV, 30-39 MIN: ICD-10-PCS | Mod: S$GLB,,, | Performed by: OPHTHALMOLOGY

## 2023-03-13 PROCEDURE — 1160F PR REVIEW ALL MEDS BY PRESCRIBER/CLIN PHARMACIST DOCUMENTED: ICD-10-PCS | Mod: CPTII,S$GLB,, | Performed by: OPHTHALMOLOGY

## 2023-03-13 PROCEDURE — 1159F PR MEDICATION LIST DOCUMENTED IN MEDICAL RECORD: ICD-10-PCS | Mod: CPTII,S$GLB,, | Performed by: OPHTHALMOLOGY

## 2023-03-13 RX ORDER — SODIUM PICOSULFATE, MAGNESIUM OXIDE, AND ANHYDROUS CITRIC ACID 10; 3.5; 12 MG/160ML; G/160ML; G/160ML
LIQUID ORAL
COMMUNITY
Start: 2023-01-10

## 2023-03-13 NOTE — PROGRESS NOTES
HPI    DLS:12/15/2022 [place]    Patient here for 3 month follow up NAION and Review HVF.  Pt states OU vision seem to be improving. Wearing contacts today.  No eye pain.    I have personally interviewed the patient, reviewed the history and   examined the patient and agree with the technician's exam.   Last edited by Ima Barnhart MD on 3/13/2023  9:52 AM.        ROS    Positive for: Psychiatric  Last edited by Iam Barnhart MD on 3/13/2023 10:15 AM.        Assessment /Plan     For exam results, see Encounter Report.    NAION (non-arteritic anterior ischemic optic neuropathy), left eye  -     Bauer Visual Field - OU - Extended - Both Eyes  -     Posterior Segment OCT Optic Nerve- Both eyes      Ms. Cunningham's left optic nerve anatomy and function are consistent with the diagnosis of NAION. No further diagnostic testing is indicated. She will return to Dr. Guaman for continued care and to me as requested

## 2023-08-10 ENCOUNTER — TELEPHONE (OUTPATIENT)
Dept: OBSTETRICS AND GYNECOLOGY | Facility: CLINIC | Age: 53
End: 2023-08-10
Payer: COMMERCIAL

## 2023-08-10 NOTE — TELEPHONE ENCOUNTER
----- Message from Payal Grewal sent at 8/8/2023 10:19 AM CDT -----  Type:  Mammogram    Caller is requesting to schedule their annual mammogram appointment.  Order is not listed in EPIC.  Please enter order and contact patient to schedule.  Name of Caller: pt     Where would they like the mammogram performed? [facility] [place]    Would the patient rather a call back or a response via [facility]? call    Best Call Back Number: [phone] (home)       Additional Information:

## 2023-08-10 NOTE — TELEPHONE ENCOUNTER
Pt call was returned. Pt was notified that orders would not be able to be placed due to her not being seen in office in 2 years. Pt verbalized understanding.

## 2023-10-02 ENCOUNTER — OFFICE VISIT (OUTPATIENT)
Dept: OBSTETRICS AND GYNECOLOGY | Facility: CLINIC | Age: 53
End: 2023-10-02
Payer: COMMERCIAL

## 2023-10-02 VITALS
WEIGHT: 161.38 LBS | BODY MASS INDEX: 31.52 KG/M2 | DIASTOLIC BLOOD PRESSURE: 82 MMHG | SYSTOLIC BLOOD PRESSURE: 124 MMHG

## 2023-10-02 DIAGNOSIS — Z01.419 ENCOUNTER FOR GYNECOLOGICAL EXAMINATION WITHOUT ABNORMAL FINDING: Primary | ICD-10-CM

## 2023-10-02 DIAGNOSIS — Z12.31 BREAST CANCER SCREENING BY MAMMOGRAM: ICD-10-CM

## 2023-10-02 PROCEDURE — 1159F MED LIST DOCD IN RCRD: CPT | Mod: CPTII,S$GLB,, | Performed by: OBSTETRICS & GYNECOLOGY

## 2023-10-02 PROCEDURE — 3074F SYST BP LT 130 MM HG: CPT | Mod: CPTII,S$GLB,, | Performed by: OBSTETRICS & GYNECOLOGY

## 2023-10-02 PROCEDURE — 99999 PR PBB SHADOW E&M-EST. PATIENT-LVL III: ICD-10-PCS | Mod: PBBFAC,,, | Performed by: OBSTETRICS & GYNECOLOGY

## 2023-10-02 PROCEDURE — 99999 PR PBB SHADOW E&M-EST. PATIENT-LVL III: CPT | Mod: PBBFAC,,, | Performed by: OBSTETRICS & GYNECOLOGY

## 2023-10-02 PROCEDURE — 3079F PR MOST RECENT DIASTOLIC BLOOD PRESSURE 80-89 MM HG: ICD-10-PCS | Mod: CPTII,S$GLB,, | Performed by: OBSTETRICS & GYNECOLOGY

## 2023-10-02 PROCEDURE — 3008F BODY MASS INDEX DOCD: CPT | Mod: CPTII,S$GLB,, | Performed by: OBSTETRICS & GYNECOLOGY

## 2023-10-02 PROCEDURE — 99396 PR PREVENTIVE VISIT,EST,40-64: ICD-10-PCS | Mod: S$GLB,,, | Performed by: OBSTETRICS & GYNECOLOGY

## 2023-10-02 PROCEDURE — 99396 PREV VISIT EST AGE 40-64: CPT | Mod: S$GLB,,, | Performed by: OBSTETRICS & GYNECOLOGY

## 2023-10-02 PROCEDURE — 3008F PR BODY MASS INDEX (BMI) DOCUMENTED: ICD-10-PCS | Mod: CPTII,S$GLB,, | Performed by: OBSTETRICS & GYNECOLOGY

## 2023-10-02 PROCEDURE — 88175 CYTOPATH C/V AUTO FLUID REDO: CPT | Performed by: OBSTETRICS & GYNECOLOGY

## 2023-10-02 PROCEDURE — 1159F PR MEDICATION LIST DOCUMENTED IN MEDICAL RECORD: ICD-10-PCS | Mod: CPTII,S$GLB,, | Performed by: OBSTETRICS & GYNECOLOGY

## 2023-10-02 PROCEDURE — 87624 HPV HI-RISK TYP POOLED RSLT: CPT | Performed by: OBSTETRICS & GYNECOLOGY

## 2023-10-02 PROCEDURE — 3074F PR MOST RECENT SYSTOLIC BLOOD PRESSURE < 130 MM HG: ICD-10-PCS | Mod: CPTII,S$GLB,, | Performed by: OBSTETRICS & GYNECOLOGY

## 2023-10-02 PROCEDURE — 3079F DIAST BP 80-89 MM HG: CPT | Mod: CPTII,S$GLB,, | Performed by: OBSTETRICS & GYNECOLOGY

## 2023-10-02 RX ORDER — ROSUVASTATIN CALCIUM 10 MG/1
10 TABLET, COATED ORAL
COMMUNITY
Start: 2023-07-13

## 2023-10-02 RX ORDER — CYANOCOBALAMIN (VITAMIN B-12) 250 MCG
250 TABLET ORAL
COMMUNITY

## 2023-10-02 NOTE — PROGRESS NOTES
Subjective:      Patient ID: Carmita Cunningham is a 52 y.o. female.    Chief Complaint:  Well Woman      History of Present Illness  HPI  Annual Exam-Premenopausal  Patient presents for annual exam. The patient has no complaints today. The patient is sexually active. GYN screening history: last pap: was normal and last mammogram: was normal. The patient wears seatbelts: yes. The patient participates in regular exercise: yes. Has the patient ever been transfused or tattooed?: no. The patient reports that there is not domestic violence in her life.    GYN & OB History  Patient's last menstrual period was 2023 (within days).   Date of Last Pap: 2020    OB History    Para Term  AB Living   0 0 0 0 0 0   SAB IAB Ectopic Multiple Live Births   0 0 0 0       Past Medical History:  History reviewed. No pertinent past medical history.    Past Surgical History:  History reviewed. No pertinent surgical history.    Family History:  Family History   Problem Relation Age of Onset    Breast cancer Maternal Grandmother 68    Colon cancer Neg Hx     Ovarian cancer Neg Hx     Stroke Neg Hx     Diabetes Neg Hx     Hypertension Neg Hx        Allergies:  Review of patient's allergies indicates:   Allergen Reactions    No known drug allergies        Medications:  Current Outpatient Medications on File Prior to Visit   Medication Sig Dispense Refill    cyanocobalamin (VITAMIN B-12) 250 MCG tablet Take 250 mcg by mouth.      MOUNJARO 5 mg/0.5 mL PnIj Inject 5 mg into the skin once a week.      rosuvastatin (CRESTOR) 10 MG tablet Take 10 mg by mouth.      CLENPIQ 10 mg-3.5 gram- 12 gram/160 mL Soln SMARTSIG:unspecified By Mouth As Directed       No current facility-administered medications on file prior to visit.       Social History:  Social History     Tobacco Use    Smoking status: Never    Smokeless tobacco: Never   Substance Use Topics    Alcohol use: Yes     Comment: occasional    Drug use: No             Review of Systems  Review of Systems   Constitutional: Negative.    HENT: Negative.     Eyes: Negative.    Respiratory: Negative.     Cardiovascular: Negative.    Gastrointestinal: Negative.    Endocrine: Negative.    Genitourinary: Negative.    Musculoskeletal: Negative.    Integumentary:  Negative.   Neurological: Negative.    Hematological: Negative.    Psychiatric/Behavioral: Negative.     Breast: negative.           Objective:     Physical Exam:   Constitutional: She is oriented to person, place, and time. She appears well-nourished.    HENT:   Head: Normocephalic and atraumatic.    Eyes: EOM are normal. Right eye exhibits normal extraocular motion. Left eye exhibits normal extraocular motion.    Neck: No thyromegaly present.    Cardiovascular:  Normal rate.             Pulmonary/Chest: Effort normal. No respiratory distress. Right breast exhibits no mass, no skin change and no tenderness. Left breast exhibits no mass, no skin change and no tenderness. Breasts are symmetrical.        Abdominal: Soft. She exhibits no distension and no mass. There is no abdominal tenderness.     Genitourinary:    Vagina, uterus, right adnexa and left adnexa normal.      Pelvic exam was performed with patient supine.   The external female genitalia was normal.   No external genitalia lesions identified,Genitalia hair distrobution normal .   Labial bartholins normal.There is no rash or lesion on the right labia. There is no rash or lesion on the left labia. Cervix is normal. Right adnexum displays no tenderness and no fullness. Left adnexum displays no tenderness and no fullness. No  no vaginal discharge or bleeding in the vagina.    No signs of injury in the vagina.   Vagina was moist.Cervix exhibits no motion tenderness and no friability. Uterus consistancy normal. and Uerus contour normal  Uterus is not tender. Normal urethral meatus.Urethral Meatus exhibits: urethral lesion and prolapsedUrethra findings: no urethral  mass and no tendernessBladder findings: no bladder distention and no bladder tenderness          Musculoskeletal: Normal range of motion.      Lymphadenopathy:     She has no cervical adenopathy.    Neurological: She is oriented to person, place, and time.   Cranial Nerves II-XII grossly intact.    Skin: No rash noted. No erythema.    Psychiatric: She has a normal mood and affect. Her behavior is normal.         Assessment:     1. Encounter for gynecological examination without abnormal finding    2. Breast cancer screening by mammogram               Plan:     1. Encounter for gynecological examination without abnormal finding  - Pap and HPV done today.  -   Screening tests as ordered.  - Diet and exercise encouraged.    Counseling: injury prevention: Driving under the influence of alcohol  Seatbelts  Perimenopause/Menopause  Stress management techniques  indications for and frequency of periodic gynecologic exam  reviewed current Pap guidelines. Explained new understanding of natural history of cervical disease and improved Paps. Recommended guideline concordant care.  - Liquid-Based Pap Smear, Screening  - HPV High Risk Genotypes, PCR    2. Breast cancer screening by mammogram  - Self breast exams encouraged  - Mammo Digital Screening Bilat w/ Alexander; Future

## 2023-10-09 LAB
FINAL PATHOLOGIC DIAGNOSIS: NORMAL
Lab: NORMAL

## 2023-10-10 ENCOUNTER — PATIENT MESSAGE (OUTPATIENT)
Dept: OBSTETRICS AND GYNECOLOGY | Facility: CLINIC | Age: 53
End: 2023-10-10
Payer: COMMERCIAL

## 2023-11-06 ENCOUNTER — HOSPITAL ENCOUNTER (OUTPATIENT)
Dept: RADIOLOGY | Facility: HOSPITAL | Age: 53
Discharge: HOME | End: 2023-11-06
Attending: OBSTETRICS & GYNECOLOGY
Payer: COMMERCIAL

## 2023-11-06 DIAGNOSIS — Z12.31 BREAST CANCER SCREENING BY MAMMOGRAM: ICD-10-CM

## 2023-11-06 PROCEDURE — 77063 MAMMO DIGITAL SCREENING BILAT WITH TOMO: ICD-10-PCS | Mod: 26,,, | Performed by: RADIOLOGY

## 2023-11-06 PROCEDURE — 77067 MAMMO DIGITAL SCREENING BILAT WITH TOMO: ICD-10-PCS | Mod: 26,,, | Performed by: RADIOLOGY

## 2023-11-06 PROCEDURE — 77067 SCR MAMMO BI INCL CAD: CPT | Mod: TC

## 2023-11-06 PROCEDURE — 77067 SCR MAMMO BI INCL CAD: CPT | Mod: 26,,, | Performed by: RADIOLOGY

## 2023-11-06 PROCEDURE — 77063 BREAST TOMOSYNTHESIS BI: CPT | Mod: 26,,, | Performed by: RADIOLOGY

## 2024-11-11 ENCOUNTER — HOSPITAL ENCOUNTER (OUTPATIENT)
Dept: RADIOLOGY | Facility: HOSPITAL | Age: 54
Discharge: HOME | End: 2024-11-11
Attending: OBSTETRICS & GYNECOLOGY
Payer: COMMERCIAL

## 2024-11-11 DIAGNOSIS — Z12.31 ENCOUNTER FOR SCREENING MAMMOGRAM FOR BREAST CANCER: ICD-10-CM

## 2024-11-11 PROCEDURE — 77067 SCR MAMMO BI INCL CAD: CPT | Mod: 26,,, | Performed by: RADIOLOGY

## 2024-11-11 PROCEDURE — 77067 SCR MAMMO BI INCL CAD: CPT | Mod: TC

## 2024-11-11 PROCEDURE — 77063 BREAST TOMOSYNTHESIS BI: CPT | Mod: 26,,, | Performed by: RADIOLOGY

## 2024-11-19 ENCOUNTER — PATIENT MESSAGE (OUTPATIENT)
Dept: OBSTETRICS AND GYNECOLOGY | Facility: CLINIC | Age: 54
End: 2024-11-19
Payer: COMMERCIAL

## 2024-12-09 ENCOUNTER — OFFICE VISIT (OUTPATIENT)
Dept: OBSTETRICS AND GYNECOLOGY | Facility: CLINIC | Age: 54
End: 2024-12-09
Payer: COMMERCIAL

## 2024-12-09 VITALS
BODY MASS INDEX: 31.17 KG/M2 | DIASTOLIC BLOOD PRESSURE: 90 MMHG | SYSTOLIC BLOOD PRESSURE: 140 MMHG | WEIGHT: 159.63 LBS

## 2024-12-09 DIAGNOSIS — Z01.419 ENCOUNTER FOR GYNECOLOGICAL EXAMINATION WITHOUT ABNORMAL FINDING: Primary | ICD-10-CM

## 2024-12-09 DIAGNOSIS — Z12.31 BREAST CANCER SCREENING BY MAMMOGRAM: ICD-10-CM

## 2024-12-09 PROCEDURE — 3008F BODY MASS INDEX DOCD: CPT | Mod: CPTII,S$GLB,, | Performed by: OBSTETRICS & GYNECOLOGY

## 2024-12-09 PROCEDURE — 99396 PREV VISIT EST AGE 40-64: CPT | Mod: S$GLB,,, | Performed by: OBSTETRICS & GYNECOLOGY

## 2024-12-09 PROCEDURE — 1159F MED LIST DOCD IN RCRD: CPT | Mod: CPTII,S$GLB,, | Performed by: OBSTETRICS & GYNECOLOGY

## 2024-12-09 PROCEDURE — 99999 PR PBB SHADOW E&M-EST. PATIENT-LVL III: CPT | Mod: PBBFAC,,, | Performed by: OBSTETRICS & GYNECOLOGY

## 2024-12-09 PROCEDURE — 3080F DIAST BP >= 90 MM HG: CPT | Mod: CPTII,S$GLB,, | Performed by: OBSTETRICS & GYNECOLOGY

## 2024-12-09 PROCEDURE — 3077F SYST BP >= 140 MM HG: CPT | Mod: CPTII,S$GLB,, | Performed by: OBSTETRICS & GYNECOLOGY

## 2024-12-09 NOTE — PROGRESS NOTES
Subjective:      Patient ID: Carmita Cunningham is a 54 y.o. female.    Chief Complaint:  No chief complaint on file.      History of Present Illness  HPI  Annual Exam-Postmenopausal  Patient presents for annual exam. The patient has no complaints today. The patient is sexually active. GYN screening history: last pap: was normal and last mammogram: was normal. The patient is not taking hormone replacement therapy. Patient denies post-menopausal vaginal bleeding. The patient wears seatbelts: yes. The patient participates in regular exercise: yes. Has the patient ever been transfused or tattooed?: no. The patient reports that there is not domestic violence in her life.        GYN & OB History  No LMP recorded.   Date of Last Pap: 10/9/2023    OB History    Para Term  AB Living   0 0 0 0 0 0   SAB IAB Ectopic Multiple Live Births   0 0 0 0       Past Medical History:  No past medical history on file.    Past Surgical History:  No past surgical history on file.    Family History:  Family History   Problem Relation Name Age of Onset    Breast cancer Maternal Grandmother  68    Colon cancer Neg Hx      Ovarian cancer Neg Hx      Stroke Neg Hx      Diabetes Neg Hx      Hypertension Neg Hx         Allergies:  Review of patient's allergies indicates:   Allergen Reactions    No known drug allergies        Medications:  Current Outpatient Medications on File Prior to Visit   Medication Sig Dispense Refill    CLENPIQ 10 mg-3.5 gram- 12 gram/160 mL Soln SMARTSIG:unspecified By Mouth As Directed      cyanocobalamin (VITAMIN B-12) 250 MCG tablet Take 250 mcg by mouth.      MOUNJARO 5 mg/0.5 mL PnIj Inject 5 mg into the skin once a week.      rosuvastatin (CRESTOR) 10 MG tablet Take 10 mg by mouth.       No current facility-administered medications on file prior to visit.       Social History:  Social History     Tobacco Use    Smoking status: Never    Smokeless tobacco: Never   Substance Use Topics    Alcohol use:  Yes     Comment: occasional    Drug use: No            Review of Systems  Review of Systems   Constitutional: Negative.    HENT: Negative.     Eyes: Negative.    Respiratory: Negative.     Cardiovascular: Negative.    Gastrointestinal: Negative.    Endocrine: Negative.    Genitourinary: Negative.    Musculoskeletal: Negative.    Integumentary:  Negative.   Neurological: Negative.    Hematological: Negative.    Psychiatric/Behavioral: Negative.     Breast: negative.           Objective:     Physical Exam:   Constitutional: She is oriented to person, place, and time. She appears well-nourished.    HENT:   Head: Normocephalic and atraumatic.    Eyes: EOM are normal. Right eye exhibits normal extraocular motion. Left eye exhibits normal extraocular motion.    Neck: No thyromegaly present.    Cardiovascular:  Normal rate.             Pulmonary/Chest: Effort normal. No respiratory distress. Right breast exhibits no mass, no skin change and no tenderness. Left breast exhibits no mass, no skin change and no tenderness. Breasts are symmetrical.        Abdominal: Soft. She exhibits no distension and no mass. There is no abdominal tenderness.     Genitourinary:    Vagina, uterus, right adnexa and left adnexa normal.      Pelvic exam was performed with patient supine.   The external female genitalia was normal.   No external genitalia lesions identified,Genitalia hair distrobution normal .     Labial bartholins normal.There is no rash or lesion on the right labia. There is no rash or lesion on the left labia. Cervix is normal. Right adnexum displays no tenderness and no fullness. Left adnexum displays no tenderness and no fullness. No vaginal discharge or bleeding in the vagina.    No signs of injury in the vagina.   Vagina was moist.Cervix exhibits no motion tenderness and no friability. Uterus consistancy normal and Uerus contour normal  Uterus is not tender. Normal urethral meatus.Urethral Meatus exhibits: no urethral  lesionUrethra findings: no urethral mass, no tenderness and no prolapsedBladder findings: no bladder distention and no bladder tenderness          Musculoskeletal: Normal range of motion.      Lymphadenopathy:     She has no cervical adenopathy.    Neurological: She is oriented to person, place, and time.   Cranial Nerves II-XII grossly intact.    Skin: No rash noted. No erythema.    Psychiatric: She has a normal mood and affect. Her behavior is normal.         Assessment:     1. Encounter for gynecological examination without abnormal finding    2. Breast cancer screening by mammogram               Plan:     1. Encounter for gynecological examination without abnormal finding  - Pap due in 2 years.  -   Screening tests as ordered.  - Diet and exercise encouraged.    Counseling: injury prevention: Driving under the influence of alcohol  Seatbelts  Perimenopause/Menopause  Stress management techniques  indications for and frequency of periodic gynecologic exam  reviewed current Pap guidelines. Explained new understanding of natural history of cervical disease and improved Paps. Recommended guideline concordant care.      2. Breast cancer screening by mammogram  - Self breast exams encouraged  - Mammo Digital Screening Bilat w/ Alexander; Future

## 2025-01-28 ENCOUNTER — TELEPHONE (OUTPATIENT)
Dept: OPHTHALMOLOGY | Facility: CLINIC | Age: 55
End: 2025-01-28
Payer: COMMERCIAL

## 2025-01-28 DIAGNOSIS — H47.012 NAION (NON-ARTERITIC ANTERIOR ISCHEMIC OPTIC NEUROPATHY), LEFT EYE: Primary | ICD-10-CM

## 2025-01-28 NOTE — TELEPHONE ENCOUNTER
----- Message from Maria M sent at 1/28/2025  1:17 PM CST -----  Contact: pt @ [phone]  SANIYA ERVIN calling regarding Patient Advice (message) for #pt is calling to speak with Luma, pt says she needs to be seen today, asking for call back

## 2025-01-28 NOTE — TELEPHONE ENCOUNTER
----- Message from Mayra sent at 1/28/2025  8:04 AM CST -----  Regarding: Ugrent Appt  Type: Urgent Appt     Who Called:SANIYA ERVIN    Would the patient rather a call back or a response via [facility]? Call back    Best Call Back Number: [phone]    Additional Information:Patient called about being seen today for inflamed optic nerve. Patient states  sent a urgent referral for patient yesterday.

## 2025-01-29 ENCOUNTER — TELEPHONE (OUTPATIENT)
Dept: OPHTHALMOLOGY | Facility: CLINIC | Age: 55
End: 2025-01-29
Payer: COMMERCIAL

## 2025-01-29 NOTE — TELEPHONE ENCOUNTER
Spoke with pt that Dr. Luo can not prescribed medication to pt she has not seen, also Oral prednisone is not used to treat NAION.

## 2025-01-29 NOTE — TELEPHONE ENCOUNTER
----- Message from Mayra sent at 1/29/2025 11:03 AM CST -----  Regarding: Rx Inquiry  Type: Rx inquiry     Who Called:SANIYA ERVIN    Would the patient rather a call back or a response via [facility]? Call back    Best Call Back Number:  [phone]    Additional Information:Patient calling about rx Oral prednisone and asking if she can be prescribed rx to help swelling?

## 2025-01-29 NOTE — PROGRESS NOTES
"Date:  2/3/2025    ?  Referring Provider:   No ref. provider found    Copies of Letters to the Following:   No ref. provider found    Chief Complaint:  I saw Carmita Cunningham at the [facility] for neuro-ophthalmic evaluation.   She is a 54 y.o. female with a history of HLD, obesity, history of NAION OS in 2022, who presents for evaluation of suspected NAION OD.    History:     HPI    Referred: Dr. Egan    53 y/o female present to Neuro-ophthalmic clinic for NAION evaluation. She   reports seeing "fog" in vision that occurred 2 weeks ago. Visual   disturbance are daily and lasts for 2-3 seconds.  She was told by optom   that right optic nerves are swollen . She has  episodes of onset blindness   when entering a room that is dark to light and vice versa. She denies any   sleeping disorder and snoring.  Last MRI was 2022. No ocular pain, no   headaches, no migraines, no diplopia or changing in color vision reported.   She did mention that when she started taking Mounjaro, she had developed   NAION of left eye.     eyemeds  No gtts  Last edited by Florina Shepherd on 2/3/2025  9:07 AM.          12/2022 [place]  "NAION (non-arteritic anterior ischemic optic neuropathy), left eye        Ms. 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 Stan slaughter no evidence to suggest GCA and a CT showed no mass lesion. She has a good chance of regaining some vision in her  left eye. I will repeat her exam and visual field testing in 3 months. IONDT discussed in detail."  ?  Current Outpatient Medications   Medication Sig Dispense Refill    cyanocobalamin (VITAMIN B-12) 250 MCG tablet Take 250 mcg by mouth.      MOUNJARO 5 mg/0.5 mL PnIj Inject 5 mg into the skin once a week.      rosuvastatin (CRESTOR) 10 MG tablet Take 10 mg by mouth.       No current facility-administered medications for this visit.     Review of patient's allergies " indicates:   Allergen Reactions    No known drug allergies      No past medical history on file.  No past surgical history on file.  Family History   Problem Relation Name Age of Onset    Breast cancer Maternal Grandmother  68    Colon cancer Neg Hx      Ovarian cancer Neg Hx      Stroke Neg Hx      Diabetes Neg Hx      Hypertension Neg Hx       Social History     Socioeconomic History    Marital status:    Tobacco Use    Smoking status: Never     Passive exposure: Never    Smokeless tobacco: Never   Substance and Sexual Activity    Alcohol use: Yes     Comment: occasional    Drug use: No    Sexual activity: Yes     Partners: Male     Social Drivers of Health     Financial Resource Strain: Low Risk  (12/8/2024)    Overall Financial Resource Strain (CARDIA)     Difficulty of Paying Living Expenses: Not hard at all   Food Insecurity: No Food Insecurity (12/8/2024)    Hunger Vital Sign     Worried About Running Out of Food in the Last Year: Never true     Ran Out of Food in the Last Year: Never true   Transportation Needs: No Transportation Needs (10/30/2024)    Received from [facility] - Transportation     Lack of Transportation (Medical): No     Lack of Transportation (Non-Medical): No   Physical Activity: Insufficiently Active (12/8/2024)    Exercise Vital Sign     Days of Exercise per Week: 2 days     Minutes of Exercise per Session: 50 min   Stress: No Stress Concern Present (12/8/2024)    [employer] - Occupational Stress Questionnaire     Feeling of Stress : Only a little   Housing Stability: Unknown (12/8/2024)    Housing Stability Vital Sign     Unable to Pay for Housing in the Last Year: No       Examination:  She was well-appearing. She was alert and oriented. Attention span and concentration were normal. Speech, language, memory, and general knowledge were intact.      Her distance visual acuity without correction was CF at 1'  in the left eye. Her distance  visual acuity with correction was 20/20  in the right eye. Her near visual acuity without correction was 20/800 PH NI in the left eye. Her near visual acuity with correction was J1 in the right eye     She perceived 8/8 OD and 0/8 OS Ishihara color plates correctly. Pupils were brisk to light without an afferent defect with a 1.2 log unit left APD. Ocular ductions were full. Orthophoric in primary, right, and left gaze by cross cover. There was no nystagmus. Saccades and pursuits were normal. Lids were symmetric.     Optic discs with circumferential non-pallid edema OD and pallor OS. Pupillary dilation was not necessary for visualization of the optic disc today.     Facial sensation was equal in V1 and V2.    Laboratories Reviewed:     N/a  ?  Neuroimaging Reviewed:     12/222 CT orbits  The bilateral ocular globes, lens, pre and postseptal region of the orbits demonstrate no abnormalities.  The bilateral ocular muscles appear normal in size and density.  The bilateral orbital apex appear normal.     The sella appears normal.  The optic chiasm demonstrate nothing unusual.     The optic nerve and nerve sheath complex demonstrate symmetrical size and appear within normal limits.     The remainder of the visualized intracranial structures appear normal.     The paranasal sinuses and mastoid air cells are well aerated.     The bilateral temporomandibular joints appear normal.     Impression:     Normal appearance of the bilateral orbits.  ?  Ocular Imaging, Photos, Records Reviewed:     OCT RNFL 1/31/2025:   Right Eye - Average RNFL 419 global elevation   Left Eye - Average RNFL inferior and temporal thinning, borderline superior thinning     Visual Field Test 24-2 OU 1/31/2025: Right Eye - fixation losses 2/11, false positives 0%, false negatives 0%, MD -0.11dB, Impression OD: one dense point of BSE. Left Eye - fixation losses 13/16, false positives 0%, false negatives 23%, MD -21.30dB, Impression OS: dense depression  with IT sparing.   ?  Impression:  Carmita Cunningham has history of HLD, obesity on mounjaro, history of NAION OS in 2022, who presents for evaluation of suspected NAION OD. They report waking up with painless vision loss OS in 11/2022 (had started Mounjaro 6 weeks prior) and she was diagnosed with NAION OS. Then about 2 weeks ago in 1/2025 she noticed foggy vision, difficulty transitioning from dark to light and vice versa, light sensitivity. She denies any retro-orbital pain or pain with eye movements. She denies loud snoring, witnessed apneic spells, nighttime BP medication. She is still on mounjaro. Neuro-ophthalmologic examination was notable for good visual acuity OD and CF OS, impaired color vision OS, normal ocular motility and alignment. OCT with circumferential disc edema OD and RNFL thinning OS. Formal visual fields were nearly full OD (one point of blind spot enlargement) and depressed OS with some IT>ST sparing. She has disc at risk configuration of the optic discs. I suspect that this in combination with GLP-1 agonist use may have increased risk for sequential NAION. She will stop mounjaro.  ?  Plan:  1. MRI head and orbits w/wo contrast  2. Stop Mounjaro, avoid GLP-1 agonists  3. Continue aspirin 81 mg daily  4. Follow up with optometry/ophthalmology for yearly routine eye exams and refraction needs    Follow-up:  I will see her in follow-up in 8 weeks or sooner with any change.  OCT and HVF?  ?  Visit Checklist (as applicable):  1. Status of new and prior symptoms discussed? yes  2. Neuroimaging reviewed/ ordered as appropriate? yes  3. Ocular imaging and photos reviewed/ ordered as appropriate? yes  4. Plan for work-up and treatment discussed with patient? yes  5. Potential medication side-effects and monitoring plan discussed? yes  6. Review of outside medical records was performed and pertinent details are summarized in the HPI above? N/a    Time spent on this encounter: 45 minutes. This includes  face to face time and non-face to face time preparing to see the patient (eg, review of tests), obtaining and/or reviewing separately obtained history, documenting clinical information in the electronic or other health record, independently interpreting results and communicating results to the patient/family/caregiver, or care coordinator.    Visit today included increased complexity associated with the evaluation and the longitudinal management of the patient due to the serious and complex problem of NAION requiring episodic surveillance of optic disc appearance, visual function, and formal visual urrutia.        SAVI Garcia  Neuro-Ophthalmology Consultant

## 2025-01-31 ENCOUNTER — CLINICAL SUPPORT (OUTPATIENT)
Dept: OPHTHALMOLOGY | Facility: CLINIC | Age: 55
End: 2025-01-31
Payer: COMMERCIAL

## 2025-01-31 DIAGNOSIS — H47.012 NAION (NON-ARTERITIC ANTERIOR ISCHEMIC OPTIC NEUROPATHY), LEFT EYE: ICD-10-CM

## 2025-02-03 ENCOUNTER — OFFICE VISIT (OUTPATIENT)
Dept: OPHTHALMOLOGY | Facility: CLINIC | Age: 55
End: 2025-02-03
Payer: COMMERCIAL

## 2025-02-03 DIAGNOSIS — H54.7 UNSPECIFIED VISUAL LOSS: ICD-10-CM

## 2025-02-03 DIAGNOSIS — H47.013 NAION (NON-ARTERITIC ANTERIOR ISCHEMIC OPTIC NEUROPATHY), BILATERAL: Primary | ICD-10-CM

## 2025-02-03 PROCEDURE — 1160F RVW MEDS BY RX/DR IN RCRD: CPT | Mod: CPTII,S$GLB,, | Performed by: STUDENT IN AN ORGANIZED HEALTH CARE EDUCATION/TRAINING PROGRAM

## 2025-02-03 PROCEDURE — 99999 PR PBB SHADOW E&M-EST. PATIENT-LVL II: CPT | Mod: PBBFAC,,, | Performed by: STUDENT IN AN ORGANIZED HEALTH CARE EDUCATION/TRAINING PROGRAM

## 2025-02-03 PROCEDURE — 1159F MED LIST DOCD IN RCRD: CPT | Mod: CPTII,S$GLB,, | Performed by: STUDENT IN AN ORGANIZED HEALTH CARE EDUCATION/TRAINING PROGRAM

## 2025-02-03 PROCEDURE — 99215 OFFICE O/P EST HI 40 MIN: CPT | Mod: S$GLB,,, | Performed by: STUDENT IN AN ORGANIZED HEALTH CARE EDUCATION/TRAINING PROGRAM

## 2025-02-03 PROCEDURE — G2211 COMPLEX E/M VISIT ADD ON: HCPCS | Mod: S$GLB,,, | Performed by: STUDENT IN AN ORGANIZED HEALTH CARE EDUCATION/TRAINING PROGRAM

## 2025-02-17 ENCOUNTER — RESULTS FOLLOW-UP (OUTPATIENT)
Dept: OPHTHALMOLOGY | Facility: CLINIC | Age: 55
End: 2025-02-17
Payer: COMMERCIAL

## 2025-02-17 ENCOUNTER — HOSPITAL ENCOUNTER (OUTPATIENT)
Dept: RADIOLOGY | Facility: HOSPITAL | Age: 55
Discharge: HOME | End: 2025-02-17
Attending: STUDENT IN AN ORGANIZED HEALTH CARE EDUCATION/TRAINING PROGRAM
Payer: COMMERCIAL

## 2025-02-17 DIAGNOSIS — H54.7 UNSPECIFIED VISUAL LOSS: ICD-10-CM

## 2025-02-17 PROCEDURE — 25500020 PHARM REV CODE 255: Performed by: STUDENT IN AN ORGANIZED HEALTH CARE EDUCATION/TRAINING PROGRAM

## 2025-02-17 PROCEDURE — 70543 MRI ORBT/FAC/NCK W/O &W/DYE: CPT | Mod: TC

## 2025-02-17 PROCEDURE — A9585 GADOBUTROL INJECTION: HCPCS | Performed by: STUDENT IN AN ORGANIZED HEALTH CARE EDUCATION/TRAINING PROGRAM

## 2025-02-17 RX ORDER — GADOBUTROL 604.72 MG/ML
7 INJECTION INTRAVENOUS
Status: COMPLETED | OUTPATIENT
Start: 2025-02-17 | End: 2025-02-17

## 2025-02-17 RX ADMIN — GADOBUTROL 7 ML: 604.72 INJECTION INTRAVENOUS at 02:02

## 2025-03-06 ENCOUNTER — TELEPHONE (OUTPATIENT)
Dept: OPHTHALMOLOGY | Facility: CLINIC | Age: 55
End: 2025-03-06
Payer: COMMERCIAL

## 2025-03-06 DIAGNOSIS — H47.013 NAION (NON-ARTERITIC ANTERIOR ISCHEMIC OPTIC NEUROPATHY), BILATERAL: Primary | ICD-10-CM

## 2025-03-06 NOTE — TELEPHONE ENCOUNTER
----- Message from Johanny Booth sent at 3/5/2025  4:09 PM CST -----    ----- Message -----  From: Aminta Rosa  Sent: 3/5/2025   8:59 AM CST  To: Valerio [facility] Staff    Type:  Sooner Apoointment RequestCaller is requesting a sooner appointment.  Name of Caller:Carmita When is the first available appointment?sooner Symptoms:black floaters As of Monday her good eye has become worseWould the patient rather a call back or a response via [facility]? [facility] Call Back Number: [phone] Information:

## 2025-03-07 ENCOUNTER — CLINICAL SUPPORT (OUTPATIENT)
Dept: OPHTHALMOLOGY | Facility: CLINIC | Age: 55
End: 2025-03-07
Payer: COMMERCIAL

## 2025-03-07 ENCOUNTER — OFFICE VISIT (OUTPATIENT)
Dept: OPTOMETRY | Facility: CLINIC | Age: 55
End: 2025-03-07
Payer: COMMERCIAL

## 2025-03-07 DIAGNOSIS — H47.013 NAION (NON-ARTERITIC ANTERIOR ISCHEMIC OPTIC NEUROPATHY), BILATERAL: ICD-10-CM

## 2025-03-07 DIAGNOSIS — H35.372 EPIRETINAL MEMBRANE (ERM) OF LEFT EYE: ICD-10-CM

## 2025-03-07 DIAGNOSIS — H43.811 POSTERIOR VITREOUS DETACHMENT OF RIGHT EYE: Primary | ICD-10-CM

## 2025-03-07 PROCEDURE — 99999 PR PBB SHADOW E&M-EST. PATIENT-LVL II: CPT | Mod: PBBFAC,,, | Performed by: OPTOMETRIST

## 2025-03-07 RX ORDER — TIRZEPATIDE 7.5 MG/.5ML
1 INJECTION, SOLUTION SUBCUTANEOUS WEEKLY
COMMUNITY
Start: 2024-07-03

## 2025-03-07 RX ORDER — HYDROCHLOROTHIAZIDE 25 MG/1
1 TABLET ORAL DAILY
COMMUNITY
Start: 2024-10-31 | End: 2025-10-31
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
## 2025-03-07 NOTE — PROGRESS NOTES
Assessment /Plan     For exam results, see Encounter Report.    NAION (non-arteritic anterior ischemic optic neuropathy), bilateral  -     Bauer Visual Field - OU - Extended - Both Eyes  -     OCT - Optic Nerve  -     Color Fundus Photography - OU - Both Eyes      Oct done ou   Optos and af done ou      Poor fixation with os 24-2  sf done ou     Rel & Fix =  fair ou    Coop =      fair     Patient has no allergies to latex or adhesives at this time  Patient wearing ctls od only     Jthomas    Optos and af done ou

## 2025-03-09 ENCOUNTER — PATIENT MESSAGE (OUTPATIENT)
Dept: OPHTHALMOLOGY | Facility: CLINIC | Age: 55
End: 2025-03-09
Payer: COMMERCIAL

## 2025-03-11 NOTE — TELEPHONE ENCOUNTER
Pt sts she saw another dr yesterday who thinks her BP is contributing to her optic nerve edema and blurred VA. She is seeing PCP Thursday to discuss changing medication. Will keep current appt with Dr. Luo as scheduled.

## 2025-04-07 ENCOUNTER — CLINICAL SUPPORT (OUTPATIENT)
Dept: OPHTHALMOLOGY | Facility: CLINIC | Age: 55
End: 2025-04-07

## 2025-04-07 ENCOUNTER — OFFICE VISIT (OUTPATIENT)
Dept: OPHTHALMOLOGY | Facility: CLINIC | Age: 55
End: 2025-04-07
Payer: COMMERCIAL

## 2025-04-07 DIAGNOSIS — H47.013 NAION (NON-ARTERITIC ANTERIOR ISCHEMIC OPTIC NEUROPATHY), BILATERAL: Primary | ICD-10-CM

## 2025-04-07 DIAGNOSIS — H54.7 UNSPECIFIED VISUAL LOSS: ICD-10-CM

## 2025-04-07 PROCEDURE — 92133 CPTRZD OPH DX IMG PST SGM ON: CPT | Mod: S$GLB,,, | Performed by: STUDENT IN AN ORGANIZED HEALTH CARE EDUCATION/TRAINING PROGRAM

## 2025-04-07 PROCEDURE — 1160F RVW MEDS BY RX/DR IN RCRD: CPT | Mod: CPTII,S$GLB,, | Performed by: STUDENT IN AN ORGANIZED HEALTH CARE EDUCATION/TRAINING PROGRAM

## 2025-04-07 PROCEDURE — 99215 OFFICE O/P EST HI 40 MIN: CPT | Mod: S$GLB,,, | Performed by: STUDENT IN AN ORGANIZED HEALTH CARE EDUCATION/TRAINING PROGRAM

## 2025-04-07 PROCEDURE — 1159F MED LIST DOCD IN RCRD: CPT | Mod: CPTII,S$GLB,, | Performed by: STUDENT IN AN ORGANIZED HEALTH CARE EDUCATION/TRAINING PROGRAM

## 2025-04-07 PROCEDURE — 92083 EXTENDED VISUAL FIELD XM: CPT | Mod: S$GLB,,, | Performed by: STUDENT IN AN ORGANIZED HEALTH CARE EDUCATION/TRAINING PROGRAM

## 2025-04-07 PROCEDURE — G2211 COMPLEX E/M VISIT ADD ON: HCPCS | Mod: S$GLB,,, | Performed by: STUDENT IN AN ORGANIZED HEALTH CARE EDUCATION/TRAINING PROGRAM

## 2025-04-07 PROCEDURE — 99999 PR PBB SHADOW E&M-EST. PATIENT-LVL II: CPT | Mod: PBBFAC,,, | Performed by: STUDENT IN AN ORGANIZED HEALTH CARE EDUCATION/TRAINING PROGRAM

## 2025-04-07 NOTE — PROGRESS NOTES
"Date:  4/7/2025    ?  Referring Provider:   No ref. provider found    Copies of Letters to the Following:   No ref. provider found    Chief Complaint:  I saw Carmita Cunningham at the [facility] for neuro-ophthalmic evaluation.   She is a 54 y.o. female with a history of HLD, obesity, history of NAION OS in 2022, who presents for follow up of bilateral NAION.    History:     HPI    Referred: Dr. Egan    55 y/o female present to Neuro-ophthalmic clinic for NAION evaluation. She   reports seeing "fog" in vision that occurred 2 weeks ago. Visual   disturbance are daily and lasts for 2-3 seconds.  She was told by optom   that right optic nerves are swollen . She has  episodes of onset blindness   when entering a room that is dark to light and vice versa. She denies any   sleeping disorder and snoring.  Last MRI was 2022. No ocular pain, no   headaches, no migraines, no diplopia or changing in color vision reported.   She did mention that when she started taking Mounjaro, she had developed   NAION of left eye.     eyemeds  No gtts  Last edited by Florina Shepherd on 2/3/2025  9:07 AM.          12/2022 [place]  "NAION (non-arteritic anterior ischemic optic neuropathy), left eye        Ms. 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 Sje kasandra no evidence to suggest GCA and a CT showed no mass lesion. She has a good chance of regaining some vision in her  left eye. I will repeat her exam and visual field testing in 3 months. IONDT discussed in detail."  ?    Interval History: 4/7/2025    HPI    DSL- 2/3/2025     55 y/o female present to clinic for NAION f/u along with testing. She   reports last month she was having episodes of acute vision loss of right   eye. She saw  to rule out Retinal detachment. She felt that   she was going "bind." She then saw Dr. Ruffin, who discover blood   pressure was " "elevated. She awaiting for lab order results. She was told to   start taking dorzolamide/timolol, she only used once. She has sleep study   test scheduled for tomorrow. Today's visit, she does feel vision is   better. She notice visual improvement in left eye. No diplopia, no ocular   pain, or dry eyes reports. Her Grandmother has RA. Dr. Garcia recommended   LP.     Eyemeds  Omar/inés OU PRN   Last edited by Florina Shepherd on 4/7/2025 11:52 AM.            3/7/2025 Anita  "  1. Educated pt on findings. No holes, tears, detachments 360 OU. (-)SS OD. Educated on s/s of RD and to RTC ASAP if occur. Monitor 1 month (pt already scheduled with Dr. Luo) unless changes noted sooner.       2. Educated pt on finding. Unsure of likelihood of improving vision with surgical intervention in light of ONH damage. Pt to discuss with Dr. Luo.     3. Continue care with Dr. Luo as scheduled. Reviewed testing with pt that Dr. Luo had ordered today (performed before pt was scheduled to see me in office). Reassured pt that VF defect OD is related to her relatively acute NAION that Dr. Luo has been monitoring rather than a retinal issue.     Today's visit is associated with current and anticipated ongoing medical care related to this patient's single serious/complex condition (NAION). Follow up is to be continued indefinitely to monitor the condition.     RTC x 1 month for follow up with Dr. Luo or ASAP if new symptoms arise in the interim--pt voiced understanding   "    Current Outpatient Medications   Medication Sig Dispense Refill    cyanocobalamin (VITAMIN B-12) 250 MCG tablet Take 250 mcg by mouth.      hydroCHLOROthiazide (HYDRODIURIL) 25 MG tablet Take 1 tablet by mouth once daily.      rosuvastatin (CRESTOR) 10 MG tablet Take 10 mg by mouth.      tirzepatide (MOUNJARO) 7.5 mg/0.5 mL PnIj Inject 1 Syringe into the skin once a week.       No current facility-administered medications for this visit.     Review of " patient's allergies indicates:   Allergen Reactions    No known drug allergies      History reviewed. No pertinent past medical history.  History reviewed. No pertinent surgical history.  Family History   Problem Relation Name Age of Onset    Breast cancer Maternal Grandmother  68    Colon cancer Neg Hx      Ovarian cancer Neg Hx      Stroke Neg Hx      Diabetes Neg Hx      Hypertension Neg Hx       Social History     Socioeconomic History    Marital status:    Tobacco Use    Smoking status: Never     Passive exposure: Never    Smokeless tobacco: Never   Substance and Sexual Activity    Alcohol use: Yes     Comment: occasional    Drug use: No    Sexual activity: Yes     Partners: Male     Social Drivers of Health     Financial Resource Strain: Low Risk  (3/7/2025)    Overall Financial Resource Strain (CARDIA)     Difficulty of Paying Living Expenses: Not hard at all   Food Insecurity: No Food Insecurity (3/7/2025)    Hunger Vital Sign     Worried About Running Out of Food in the Last Year: Never true     Ran Out of Food in the Last Year: Never true   Transportation Needs: No Transportation Needs (3/7/2025)    PRAPARE - Transportation     Lack of Transportation (Medical): No     Lack of Transportation (Non-Medical): No   Physical Activity: Insufficiently Active (3/7/2025)    Exercise Vital Sign     Days of Exercise per Week: 3 days     Minutes of Exercise per Session: 20 min   Stress: No Stress Concern Present (3/7/2025)    [employer] [place] [employer] - Occupational Stress Questionnaire     Feeling of Stress : Not at all   Housing Stability: Low Risk  (3/7/2025)    Housing Stability Vital Sign     Unable to Pay for Housing in the Last Year: No     Number of Times Moved in the Last Year: 0     Homeless in the Last Year: No       Examination:  She was well-appearing. She was alert and oriented. Attention span and concentration were normal. Speech, language, memory, and general knowledge were intact.       Her distance visual acuity without correction was 20/300  in the left eye. Her distance visual acuity with correction was 20/25  in the right eye. Her near visual acuity without correction was 20/800 PH 20/400 in the left eye. Her near visual acuity with correction was J5 PH J3 in the right eye     She perceived 4/8 OD and 0/8 OS Ishihara color plates correctly. Pupils were brisk to light without an afferent defect with trace left APD. Ocular ductions were full. There was no nystagmus. Saccades and pursuits were normal. Lids were symmetric.     Optic discs with improved non-pallid edema OD and pallor OS. Pupillary dilation was not necessary for visualization of the optic disc today.     Laboratories Reviewed:     N/a  ?  Neuroimaging Reviewed:     12/222 CT orbits  The bilateral ocular globes, lens, pre and postseptal region of the orbits demonstrate no abnormalities.  The bilateral ocular muscles appear normal in size and density.  The bilateral orbital apex appear normal.     The sella appears normal.  The optic chiasm demonstrate nothing unusual.     The optic nerve and nerve sheath complex demonstrate symmetrical size and appear within normal limits.     The remainder of the visualized intracranial structures appear normal.     The paranasal sinuses and mastoid air cells are well aerated.     The bilateral temporomandibular joints appear normal.     Impression:     Normal appearance of the bilateral orbits.    2/17/2025 MRI head and orbits w/wo contrast  Brain:     Ventricles normal in size for age without hydrocephalus or midline shift.     Cluster of nonenhancing T2 hyperintense T1 hypointense foci with mild surrounding FLAIR hyperintensity in the inferior right cerebellar hemisphere suggestive of prominent tumefactive perivascular spaces.  No significant surrounding mass effect, associated diffusion signal abnormality, or associated susceptibility.  The brain is otherwise within normal limits.     No  parenchymal mass, hemorrhage, edema or recent or remote major vascular distribution infarct.  No abnormal postcontrast parenchymal or leptomeningeal enhancement.  Partially empty sella configuration.     No extra-axial blood or fluid collection.     Normal arterial flow voids are preserved.  Bone marrow signal intensity unremarkable.     Orbits:     The globes are unremarkable.  There is mild atrophy and asymmetric increased T2 hyperintense signal within the left optic nerve without discrete postcontrast enhancement or significant edema.  No significant surrounding inflammatory change about the surrounding postseptal or perineural soft tissues.  The left optic nerve maintains normal morphology and signal without abnormal enhancement.  The optic chiasm and optic tracts are unremarkable.  No intraorbital mass or inflammatory change identified.     Impression:     Mild atrophy and increased T2 hyperintense signal about the optic nerve without evidence for associated edema, abnormal enhancement, or surrounding inflammatory change.  Findings likely relate to sequela of optic nerve atrophy.  Correlation is advised.     Cluster of nonenhancing T2 hyperintense foci about the right cerebellar hemisphere with associated FLAIR signal hyperintensity.  No associated volume loss, mass effect, or abnormal enhancement.  Findings remain nonspecific, and may relate to sequela of focal prominent perivascular spaces, noting that sequela of remote insult is also considered.     No evidence for acute intracranial hemorrhage or major vascular territory infarct.     ?  Ocular Imaging, Photos, Records Reviewed:     OCT RNFL 1/31/2025:   Right Eye - Average RNFL 419 global elevation   Left Eye - Average RNFL 63 inferior and temporal thinning, borderline superior thinning     OCT RNFL Today 4/7/2025:   Right Eye - Average RNFL 165 superior and inferior elevation, borderline nasal elevation   Left Eye - Average RNFL 62 inferior and temporal  thinning, borderline superior thinning     Visual Field Test 24-2 OU 1/31/2025: Right Eye - fixation losses 2/11, false positives 0%, false negatives 0%, MD -0.11dB, Impression OD: one dense point of BSE. Left Eye - fixation losses 13/16, false positives 0%, false negatives 23%, MD -21.30dB, Impression OS: dense depression with IT sparing.     Visual Field Test 24-2 OU Today 4/7/2025: Right Eye - fixation losses 4/15, false positives 9%, false negatives 20%, MD -18.91dB, Impression OD: severe patchy depression with relative nasal sparing. Left Eye - fixation losses 11/15, false positives 7%, false negatives 0%, MD -25.32dB, Impression OS: dense depression with IT sparing.  ?    Impression:  Carmita Cunningham has history of HLD, obesity on mounjaro, history of NAION OS in 2022, who presents for evaluation of suspected NAION OD. They report waking up with painless vision loss OS in 11/2022 (had started Mounjaro 6 weeks prior) and she was diagnosed with NAION OS. Then about 2 weeks ago in 1/2025 she noticed foggy vision, difficulty transitioning from dark to light and vice versa, light sensitivity. She denies any retro-orbital pain or pain with eye movements. She denies loud snoring, witnessed apneic spells, nighttime BP medication. She is still on mounjaro. Neuro-ophthalmologic examination was notable for good visual acuity OD and CF OS, impaired color vision OS, normal ocular motility and alignment. OCT with circumferential disc edema OD and RNFL thinning OS. Formal visual fields were nearly full OD (one point of blind spot enlargement) and depressed OS with some IT>ST sparing. She has disc at risk configuration of the optic discs. I suspect that this in combination with GLP-1 agonist use may have increased risk for sequential NAION. She will stop mounjaro.    4/7/2025: Since last visit, unfortunately she noticed a worsening of vision in the right eye and was found to have continued disc edema. Likely natural  progression of her NAION. MRI head and orbits w/wo contrast with no evidence of inflammatory or compressive lesions of the visual pathways. She recently had some inflammatory/autoimmune labs sent by her retina specialist and she expects to get some results on Thursday. She was found to have HTN and started hydrochlorothiazide with improved pressures. Sleep disorders eval planned for tomorrow.   ?  Plan:  1. Will review outside labs  2. avoid GLP-1 agonists  3. Continue aspirin 81 mg daily  4. Follow up with optometry/ophthalmology for yearly routine eye exams and refraction needs    Follow-up:  I will see her in follow-up in 8 weeks or sooner with any change.  OCT and HVF?  ?  Visit Checklist (as applicable):  1. Status of new and prior symptoms discussed? yes  2. Neuroimaging reviewed/ ordered as appropriate? yes  3. Ocular imaging and photos reviewed/ ordered as appropriate? yes  4. Plan for work-up and treatment discussed with patient? yes  5. Potential medication side-effects and monitoring plan discussed? yes  6. Review of outside medical records was performed and pertinent details are summarized in the HPI above? N/a    Time spent on this encounter: 45 minutes. This includes face to face time and non-face to face time preparing to see the patient (eg, review of tests), obtaining and/or reviewing separately obtained history, documenting clinical information in the electronic or other health record, independently interpreting results and communicating results to the patient/family/caregiver, or care coordinator.    Visit today included increased complexity associated with the evaluation and the longitudinal management of the patient due to the serious and complex problem of NAION requiring episodic surveillance of optic disc appearance, visual function, and formal visual urrutia.        SAVI Garcia  Neuro-Ophthalmology Consultant

## 2025-04-07 NOTE — PROGRESS NOTES
oct done ou     24-2 sf done ou     Rel & Fix =  good ou     Coop =      good     Patient has no allergies to latex or adhesives at this time    Jthomas    No mrx    Used age correction lens        Tocco

## 2025-06-03 ENCOUNTER — CLINICAL SUPPORT (OUTPATIENT)
Dept: OPHTHALMOLOGY | Facility: CLINIC | Age: 55
End: 2025-06-03
Payer: COMMERCIAL

## 2025-06-03 ENCOUNTER — OFFICE VISIT (OUTPATIENT)
Dept: OPHTHALMOLOGY | Facility: CLINIC | Age: 55
End: 2025-06-03
Payer: COMMERCIAL

## 2025-06-03 DIAGNOSIS — H54.7 UNSPECIFIED VISUAL LOSS: ICD-10-CM

## 2025-06-03 DIAGNOSIS — H47.013 NAION (NON-ARTERITIC ANTERIOR ISCHEMIC OPTIC NEUROPATHY), BILATERAL: Primary | ICD-10-CM

## 2025-06-03 PROCEDURE — 92133 CPTRZD OPH DX IMG PST SGM ON: CPT | Mod: S$GLB,,, | Performed by: STUDENT IN AN ORGANIZED HEALTH CARE EDUCATION/TRAINING PROGRAM

## 2025-06-03 PROCEDURE — 99999 PR PBB SHADOW E&M-EST. PATIENT-LVL II: CPT | Mod: PBBFAC,,, | Performed by: STUDENT IN AN ORGANIZED HEALTH CARE EDUCATION/TRAINING PROGRAM

## 2025-06-03 PROCEDURE — 99215 OFFICE O/P EST HI 40 MIN: CPT | Mod: S$GLB,,, | Performed by: STUDENT IN AN ORGANIZED HEALTH CARE EDUCATION/TRAINING PROGRAM

## 2025-06-03 PROCEDURE — 1159F MED LIST DOCD IN RCRD: CPT | Mod: CPTII,S$GLB,, | Performed by: STUDENT IN AN ORGANIZED HEALTH CARE EDUCATION/TRAINING PROGRAM

## 2025-06-03 PROCEDURE — 1160F RVW MEDS BY RX/DR IN RCRD: CPT | Mod: CPTII,S$GLB,, | Performed by: STUDENT IN AN ORGANIZED HEALTH CARE EDUCATION/TRAINING PROGRAM

## 2025-06-03 PROCEDURE — 92083 EXTENDED VISUAL FIELD XM: CPT | Mod: S$GLB,,, | Performed by: STUDENT IN AN ORGANIZED HEALTH CARE EDUCATION/TRAINING PROGRAM
